# Patient Record
Sex: MALE | Race: BLACK OR AFRICAN AMERICAN | NOT HISPANIC OR LATINO | Employment: FULL TIME | URBAN - METROPOLITAN AREA
[De-identification: names, ages, dates, MRNs, and addresses within clinical notes are randomized per-mention and may not be internally consistent; named-entity substitution may affect disease eponyms.]

---

## 2018-04-18 ENCOUNTER — HOSPITAL ENCOUNTER (EMERGENCY)
Facility: OTHER | Age: 58
Discharge: HOME | End: 2018-04-18
Attending: EMERGENCY MEDICINE
Payer: COMMERCIAL

## 2018-04-18 VITALS
HEART RATE: 66 BPM | WEIGHT: 300 LBS | RESPIRATION RATE: 18 BRPM | BODY MASS INDEX: 45.47 KG/M2 | HEIGHT: 68 IN | SYSTOLIC BLOOD PRESSURE: 163 MMHG | OXYGEN SATURATION: 97 % | DIASTOLIC BLOOD PRESSURE: 86 MMHG | TEMPERATURE: 98 F

## 2018-04-18 DIAGNOSIS — R07.9 CHEST PAIN: ICD-10-CM

## 2018-04-18 DIAGNOSIS — M79.602 PAIN OF LEFT UPPER EXTREMITY: ICD-10-CM

## 2018-04-18 DIAGNOSIS — M54.2 NECK PAIN: ICD-10-CM

## 2018-04-18 DIAGNOSIS — R07.9 CHEST PAIN, UNSPECIFIED TYPE: Primary | ICD-10-CM

## 2018-04-18 LAB
ALBUMIN SERPL BCP-MCNC: 3.6 G/DL
ALP SERPL-CCNC: 61 U/L
ALT SERPL W/O P-5'-P-CCNC: 13 U/L
ANION GAP SERPL CALC-SCNC: 8 MMOL/L
AST SERPL-CCNC: 18 U/L
BASOPHILS # BLD AUTO: 0.04 K/UL
BASOPHILS NFR BLD: 0.6 %
BILIRUB SERPL-MCNC: 0.4 MG/DL
BNP SERPL-MCNC: 17 PG/ML
BUN SERPL-MCNC: 13 MG/DL
CALCIUM SERPL-MCNC: 9.4 MG/DL
CHLORIDE SERPL-SCNC: 99 MMOL/L
CO2 SERPL-SCNC: 36 MMOL/L
CREAT SERPL-MCNC: 0.9 MG/DL
DIFFERENTIAL METHOD: ABNORMAL
EOSINOPHIL # BLD AUTO: 0.1 K/UL
EOSINOPHIL NFR BLD: 2.1 %
ERYTHROCYTE [DISTWIDTH] IN BLOOD BY AUTOMATED COUNT: 15 %
EST. GFR  (AFRICAN AMERICAN): >60 ML/MIN/1.73 M^2
EST. GFR  (NON AFRICAN AMERICAN): >60 ML/MIN/1.73 M^2
GLUCOSE SERPL-MCNC: 85 MG/DL
HCT VFR BLD AUTO: 44.5 %
HGB BLD-MCNC: 13.8 G/DL
LYMPHOCYTES # BLD AUTO: 2.8 K/UL
LYMPHOCYTES NFR BLD: 40.4 %
MCH RBC QN AUTO: 25.8 PG
MCHC RBC AUTO-ENTMCNC: 31 G/DL
MCV RBC AUTO: 83 FL
MONOCYTES # BLD AUTO: 0.3 K/UL
MONOCYTES NFR BLD: 3.8 %
NEUTROPHILS # BLD AUTO: 3.6 K/UL
NEUTROPHILS NFR BLD: 53.1 %
PLATELET # BLD AUTO: 270 K/UL
PMV BLD AUTO: 9.7 FL
POTASSIUM SERPL-SCNC: 3.4 MMOL/L
PROT SERPL-MCNC: 8 G/DL
RBC # BLD AUTO: 5.35 M/UL
SODIUM SERPL-SCNC: 143 MMOL/L
TROPONIN I SERPL DL<=0.01 NG/ML-MCNC: 0.02 NG/ML
WBC # BLD AUTO: 6.8 K/UL

## 2018-04-18 PROCEDURE — 85025 COMPLETE CBC W/AUTO DIFF WBC: CPT

## 2018-04-18 PROCEDURE — 25000003 PHARM REV CODE 250: Performed by: PHYSICIAN ASSISTANT

## 2018-04-18 PROCEDURE — 83880 ASSAY OF NATRIURETIC PEPTIDE: CPT

## 2018-04-18 PROCEDURE — 93005 ELECTROCARDIOGRAM TRACING: CPT

## 2018-04-18 PROCEDURE — 80053 COMPREHEN METABOLIC PANEL: CPT

## 2018-04-18 PROCEDURE — 93010 ELECTROCARDIOGRAM REPORT: CPT | Mod: ,,, | Performed by: INTERNAL MEDICINE

## 2018-04-18 PROCEDURE — 99284 EMERGENCY DEPT VISIT MOD MDM: CPT | Mod: 25

## 2018-04-18 PROCEDURE — 84484 ASSAY OF TROPONIN QUANT: CPT

## 2018-04-18 RX ORDER — NAPROXEN 375 MG/1
375 TABLET ORAL 2 TIMES DAILY PRN
Qty: 30 TABLET | Refills: 0 | Status: SHIPPED | OUTPATIENT
Start: 2018-04-18 | End: 2019-04-15

## 2018-04-18 RX ORDER — ASPIRIN 325 MG
325 TABLET, DELAYED RELEASE (ENTERIC COATED) ORAL
Status: COMPLETED | OUTPATIENT
Start: 2018-04-18 | End: 2018-04-18

## 2018-04-18 RX ADMIN — ASPIRIN 325 MG: 325 TABLET, DELAYED RELEASE ORAL at 02:04

## 2018-04-18 NOTE — ED NOTES
Pt in recliner with no complaints of active chest pain at this time; pt updated on POC and verbalized understanding; comfort needs addressed; NADN; VSS: will monitor; call light in reach

## 2018-04-18 NOTE — ED NOTES
Patient Identifiers for James Florez checked and correct  LOC: The patient is awake, alert and aware of environment with an appropriate affect, the patient is oriented x 3 and speaking appropriate.  APPEARANCE: Patient resting comfortably and in no acute distress. The patient is clean and well groomed. The patient's clothing is properly fastened.  SKIN: The skin is warm and dry. The patient has normal skin turgor and moist mucus membranes. No rashes or lesions upon observation. Skin Intact , no breakdown noted.  Musculoskeletal :  Normal range of motion noted. Moves all extremities well, No swelling or tenderness noted  RESPIRATORY: Airway is open and patent, respirations are spontaneous, patient has a normal effort and rate. Breath sounds are clear & equal, bilaterally.  CARDIAC: Patient has a normal rate and rhythm, no peripheral edema noted, capillary refill < 3 seconds.   ABDOMEN: Soft and non tender to palpation, no distention observed.   PULSES: 2+ radial & pedal pulses, symmetrical in all extremities.  NEUROLOGIC: PERRL, Pupils 3mm and reacts briskly to light. Motor strength 5/5 all extremities.  The pt's facial expression is symmetrical, patient moving all extremities, normal sensation in all extremities when touched with a finger.The patient is awake, alert and cooperative with a calm affect, patient is aware of environment.      Will continue to monitor

## 2018-04-18 NOTE — ED PROVIDER NOTES
Encounter Date: 4/18/2018       History     Chief Complaint   Patient presents with    Chest Pain     chest pain sine last night     Patient is a 57-year-old male with history of hypertension who presents to the emergency department with left arm pain and left-sided chest pain.  Patient states about a week ago, he developed a sharp pain in his left arm.  The pain has been persistent left arm.  He denies any injury.  He states the pain is worse with movement of his neck.  He states the pain is a burning, tingling sensation that radiates from his neck into his left hand.  He states late last night while lying down, he developed a spasming pain on left side of his chest.  He states this pain has been presents since last night.  He denies any worsening on exertion.  He denies shortness of breath.  He denies lower extremity edema.  He denies cough or fever.  He denies recent travel, recent surgery, previous blood clot, or exogenous hormones.      The history is provided by the patient.     Review of patient's allergies indicates:  No Known Allergies  Past Medical History:   Diagnosis Date    Depression     Hypertension      History reviewed. No pertinent surgical history.  History reviewed. No pertinent family history.  Social History   Substance Use Topics    Smoking status: Never Smoker    Smokeless tobacco: Not on file    Alcohol use Not on file      Comment: occasionally     Review of Systems   Constitutional: Negative for activity change, appetite change, chills, fatigue and fever.   HENT: Negative for congestion, ear discharge, ear pain, postnasal drip, rhinorrhea, sore throat and trouble swallowing.    Respiratory: Negative for cough, chest tightness, shortness of breath and wheezing.    Cardiovascular: Positive for chest pain. Negative for palpitations and leg swelling.   Gastrointestinal: Negative for abdominal pain, blood in stool, constipation, diarrhea, nausea and vomiting.   Genitourinary: Negative for  dysuria, flank pain and hematuria.   Musculoskeletal: Negative for back pain, neck pain and neck stiffness.        Arm pain   Skin: Negative for rash and wound.   Neurological: Negative for dizziness, syncope, speech difficulty, weakness, light-headedness, numbness and headaches.       Physical Exam     Initial Vitals [04/18/18 1257]   BP Pulse Resp Temp SpO2   (!) 186/101 85 18 98.1 °F (36.7 °C) 97 %      MAP       129.33         Physical Exam    Nursing note and vitals reviewed.  Constitutional: He appears well-developed and well-nourished. He is not diaphoretic.  Non-toxic appearance. No distress.   HENT:   Head: Normocephalic.   Right Ear: Hearing and external ear normal.   Left Ear: Hearing and external ear normal.   Nose: Nose normal.   Mouth/Throat: Uvula is midline, oropharynx is clear and moist and mucous membranes are normal. No oropharyngeal exudate.   Eyes: Conjunctivae are normal. Pupils are equal, round, and reactive to light.   Neck: Normal range of motion. Neck supple. Muscular tenderness (left sided cervical) present. No spinous process tenderness present. Normal range of motion present. No neck rigidity.   Cardiovascular: Normal rate, regular rhythm and normal heart sounds. Exam reveals no gallop and no friction rub.    No murmur heard.  No lower extremity   Pulmonary/Chest: Breath sounds normal. No respiratory distress. He has no wheezes. He has no rhonchi. He has no rales. He exhibits no tenderness.   Abdominal: Soft. Bowel sounds are normal. There is no tenderness.   Lymphadenopathy:     He has no cervical adenopathy.   Neurological: He is alert and oriented to person, place, and time. He has normal strength.   Skin: Skin is warm and dry. Capillary refill takes less than 2 seconds.   Psychiatric: He has a normal mood and affect.         ED Course   Procedures  Labs Reviewed   CBC W/ AUTO DIFFERENTIAL - Abnormal; Notable for the following:        Result Value    Hemoglobin 13.8 (*)     MCH 25.8  (*)     MCHC 31.0 (*)     RDW 15.0 (*)     Mono% 3.8 (*)     All other components within normal limits   COMPREHENSIVE METABOLIC PANEL - Abnormal; Notable for the following:     Potassium 3.4 (*)     CO2 36 (*)     All other components within normal limits   TROPONIN I   B-TYPE NATRIURETIC PEPTIDE     EKG Readings: (Independently Interpreted)   Initial Reading: No STEMI. Rhythm: Normal Sinus Rhythm. Heart Rate: 84.       X-Rays:   Independently Interpreted Readings:   Other Readings:  No cardiomegaly.  Calcification along the wall of aorta.    Imaging Results          X-Ray Chest PA And Lateral (Final result)  Result time 04/18/18 14:53:00    Final result by Yessica Denny MD (04/18/18 14:53:00)                 Narrative:    EXAMINATION:  XR CHEST PA AND LATERAL    CLINICAL HISTORY:  Chest pain, unspecified    TECHNIQUE:  PA and lateral views of the chest were performed.    COMPARISON:  Cardiac size is not enlarged.  There is calcification along the wall of the aorta.  Lungs are somewhat underinflated, image quality also degraded by patient body habitus.  There is no large volume of pleural fluid.  No focal consolidation is present.  There is some mild atelectasis at each lung base.  Mild prominence of markings is seen throughout the lungs bilaterally.  This is favored to reflect a combination of the diminished inspiration and prominent overlying soft tissue rather than parenchymal disease.    FINDINGS:  As above      Electronically signed by: Yessica Denny MD  Date:    04/18/2018  Time:    14:53                              Medical Decision Making:   Initial Assessment:   Urgent evaluation of a 57-year-old male with history of hypertension who presents to the emergency department with arm pain and chest pain.  Patient is afebrile, nontoxic appearing, and hemodynamically stable.  Patient's arm pain is been present for 1 week.  Is reproducible on exam.  Is worse with movement of the neck.  No injury.  No  midline tenderness of spine.  Patient's chest pain is described as spasming on the left side of his chest.  It is neither worsened nor alleviated by any specific factors.  No associated shortness of breath.  I do not suspect PE.  I do not suspect ACS, but cardiac workup will be performed with patient's coronary artery disease risk factors.  ED Management:  Patient's pain has been constant since last night.  Troponin is not elevated, which I suspect would be elevated if cardiac etiology.  EKG reveals no acute ischemia.  Chest x-ray reveals no acute cardiopulmonary process.  There is calcification along the wall of the aorta.  I suspect patient is experiencing radiculopathy pain.  He is given anti-inflammatories.  He is advised to establish care with primary care.  He is also advised to follow-up with cardiology for outpatient stress test.  Case discussed in depth with supervising physician who agrees with plan.  Other:   I have discussed this case with another health care provider.       <> Summary of the Discussion: Dr. Mascorro                      Clinical Impression:   The primary encounter diagnosis was Chest pain, unspecified type. Diagnoses of Chest pain, Neck pain, and Pain of left upper extremity were also pertinent to this visit.                           Meghan Mackenzie PA-C  04/18/18 8625

## 2018-04-18 NOTE — ED TRIAGE NOTES
"Pt reports left side" chest spasm" that he states comes and goes. Pt denies nausea or sob or any other s/s.  He denies that anything makes the pain worse.   "

## 2019-03-21 ENCOUNTER — TELEPHONE (OUTPATIENT)
Dept: SURGERY | Facility: CLINIC | Age: 59
End: 2019-03-21

## 2019-03-21 NOTE — TELEPHONE ENCOUNTER
Left a detailed message stating that he is scheduled in the wrong Department and that he should not come in tonight due to the bariatric team leaving at 5pm. I also contacted [employer] in Bariatrics) about this pt as well via [facility]

## 2019-04-15 ENCOUNTER — CLINICAL SUPPORT (OUTPATIENT)
Dept: BARIATRICS | Facility: CLINIC | Age: 59
End: 2019-04-15
Payer: COMMERCIAL

## 2019-04-15 ENCOUNTER — OFFICE VISIT (OUTPATIENT)
Dept: BARIATRICS | Facility: CLINIC | Age: 59
End: 2019-04-15
Payer: COMMERCIAL

## 2019-04-15 VITALS
HEIGHT: 68 IN | BODY MASS INDEX: 47.74 KG/M2 | HEART RATE: 74 BPM | SYSTOLIC BLOOD PRESSURE: 180 MMHG | DIASTOLIC BLOOD PRESSURE: 91 MMHG | WEIGHT: 315 LBS

## 2019-04-15 DIAGNOSIS — E66.01 MORBID OBESITY WITH BMI OF 50.0-59.9, ADULT: Primary | ICD-10-CM

## 2019-04-15 DIAGNOSIS — I10 ESSENTIAL HYPERTENSION: ICD-10-CM

## 2019-04-15 DIAGNOSIS — Z98.84 LAP-BAND SURGERY STATUS: ICD-10-CM

## 2019-04-15 DIAGNOSIS — M19.90 ARTHRITIS: ICD-10-CM

## 2019-04-15 DIAGNOSIS — F41.9 ANXIETY: ICD-10-CM

## 2019-04-15 PROCEDURE — 99499 NO LOS: ICD-10-PCS | Mod: S$GLB,,, | Performed by: DIETITIAN, REGISTERED

## 2019-04-15 PROCEDURE — 99999 PR PBB SHADOW E&M-EST. PATIENT-LVL IV: CPT | Mod: PBBFAC,,, | Performed by: PHYSICIAN ASSISTANT

## 2019-04-15 PROCEDURE — 99999 PR PBB SHADOW E&M-EST. PATIENT-LVL II: CPT | Mod: PBBFAC,,, | Performed by: DIETITIAN, REGISTERED

## 2019-04-15 PROCEDURE — 99999 PR PBB SHADOW E&M-EST. PATIENT-LVL II: ICD-10-PCS | Mod: PBBFAC,,, | Performed by: DIETITIAN, REGISTERED

## 2019-04-15 PROCEDURE — 99205 OFFICE O/P NEW HI 60 MIN: CPT | Mod: S$GLB,,, | Performed by: PHYSICIAN ASSISTANT

## 2019-04-15 PROCEDURE — 99499 UNLISTED E&M SERVICE: CPT | Mod: S$GLB,,, | Performed by: DIETITIAN, REGISTERED

## 2019-04-15 PROCEDURE — 99205 PR OFFICE/OUTPT VISIT, NEW, LEVL V, 60-74 MIN: ICD-10-PCS | Mod: S$GLB,,, | Performed by: PHYSICIAN ASSISTANT

## 2019-04-15 PROCEDURE — 99999 PR PBB SHADOW E&M-EST. PATIENT-LVL IV: ICD-10-PCS | Mod: PBBFAC,,, | Performed by: PHYSICIAN ASSISTANT

## 2019-04-15 RX ORDER — AMLODIPINE BESYLATE 10 MG/1
10 TABLET ORAL DAILY
COMMUNITY
End: 2021-02-21

## 2019-04-15 RX ORDER — ALLOPURINOL 100 MG/1
1 TABLET ORAL DAILY
COMMUNITY
Start: 2019-01-25

## 2019-04-15 RX ORDER — PAROXETINE HYDROCHLORIDE 40 MG/1
1 TABLET, FILM COATED ORAL DAILY
COMMUNITY
Start: 2019-03-13

## 2019-04-15 NOTE — PATIENT INSTRUCTIONS
Bariatric Diet Grocery List      High in Protein:   ? Canned tuna or chicken (packed in water)  ? Lean ground turkey breast or ground round  ? Turkey or chicken (no skin)  ? Lean pork or beef   ? Scrambled, poached, or boiled eggs  ? Baked or broiled fish and seafood (not fried!)  ? Beans, edamame and lentils  ? Low fat deli meats: turkey, chicken, ham, roast beef  ? 1% or Skim Milk, Lactaid, or Soymilk  ? Low-fat cheese, cottage cheese, mozzarella string cheese, ricotta  ? Light yogurt, FF/SF frozen yogurt, custard, SF pudding  ? Protein drinks and protein bars with 0-4 grams sugar     Fruits, Vegetables and Snacks   - Green beans, broccoli, cauliflower, spinach, asparagus, carrots, lima beans, yellow squash, zucchini, etc.  - Apple, pineapple, peach, grapes, banana, watermelon, oranges, etc.  - Fruit canned in its own juice or in water (not in syrup)  - Raw veggies  - Lettuce: dark greens like spinach and Ronny  - Unsalted Nuts  - [employer] beef jerky     Fluids:   Skim/1% milk, Lactaid, Soymilk  Sugar-free beverages  (decaf and non-carbonated)  Decaf coffee & decaf tea   Water     Healthy Eating on the go ~ Pre and Post-Surgery     (*) Means this meal is appropriate for pre-surgery consumption because it is >30g of protein per meal. Post-surgery, may want to split meal in half or save a small portion for a snack.     [facility] Kitchen  Item  Calories  Protein (g)   Mediterranean Lamb [place]  350 22   *Chicken Kabobs 290 41   *[place] Kabobs 330 40   Shrimp Kabobs 170 23   *Steak Kabobs 490 42   *Cauliflower Rice Bowl- chicken (salmon, lamb)  490 41   Mediterranean Chicken 260 34   *Protein Power Plate 520 41   Side items: Greek side salad, fruit salad, roasted vegetables, baked falafel       Huffmans   Item  Calories Protein (g)   Artisan Grilled Chicken [place], no bun  <380 36   *[place] Grilled Chicken Salad, no dressing <320 42   Double Hamburger, no bun <440 25   Side items: apple slices, side salad        Kenyas   Item Calories Protein (g)   Grilled Chicken [place], no bun  <370 34   [place] Chicken Salad, half size, no dressing 320  22   Apple Pecan Chicken Salad, half size, no dressing  340 20   Large Chili 250 21   Side items: garden or caesar side salad (no croutons), apple bites       Jimbo Calero   Item Calories Protein (g)   Grilled Chicken [place], no bun <470 37   Whopper Jr., no bun <310 13   Double Hamburger, no bun  <390 23   *Chicken Garden Salad, no croutons, use ½ packet of dressing  <520 40   Side items: garden side salad,         Chick-madelin-A  Item Calories Protein (g)   Grilled Chicken [place], no bun  <310 29   Grilled Nuggets, 8 count 140 25   Grilled Chicken Market Salad with light balsamic dressing 330 28   Small Chicken Tortilla Soup, no tortilla strips 340 23   Side items: side salad, superfood side salad, carrot raisin salad, fruit cup,          Sonic  Item Calories Protein (g)   Jr. Choi, no bun  <330 15   Classic Grilled Chicken [place], no bun <490 31   Hearty Shanks, no fritos 140 10       Ronnies   Item Calories Protein (g)   Blackened Chicken Tenders, 3 piece 170 26   Side items: green beans             Derrell Perez   Item Calories Protein (g)   Unwich: any sandwich made wrapped in lettuce instead of bread. Ask for no hinojosa.      Original Roast Beef Unwich 260 16   [place] Breast Unwich 230 14   Perfect Micronesian Unwich 340 22   Ham and Provolone Unwich 350 19   Tuna Salad [place] 280 11   The Veggie Unwich 410 17   Side items: dill pickle          Chipotle  Item Calories Protein (g)   *Salad with your choice of meat, beans, fresh tomato salsa, fajita veggies, and guacamole (NO rice) ~375 ~40        Applebees  Item Calories Protein (g)   Grilled Chicken Breast 290 38   Andrew Shrimp Salad, no wonton strips, use ½ dressing <390 26   Blackened Shrimp Caesar Salad, no croutons, use ½ dressing  <660 25   *Grilled Chicken Caesar Salad, no croutons, use ½ dressing <770 47   Tunica [place]  with Maple Mustard Glaze 350 37   Side Grilled Shrimp Skewer 110 27   Side items: steamed broccoli, garlicky green beans,               [employer]  Item Calories Protein (g)   *Spinach & Mushroom Omelette, no hollandaise sauce  <890 46   *Garden Omelette 840 46   Egg White Vegetable Omelette 380 29   *Grilled Chicken & Veggie Salad, use ½ dressing  <680 38     Olive Garden   Item Calories Protein (g)   *Herb-Grilled [place] 460 45   House salad with, no croutons. (Add grilled chicken or shrimp) <400 25   Side items: steamed broccoli       Walk Ons   Tuna Tini 410 30   Venison Connell- Bowl 330 14   Chicken Berry Pecan Salad      Side items: seasonal fruit, broccoli, green beans side salad       Sample Menu Plan: [id number] Calories;  grams of Protein     DAY 1     Breakfast  1 cup 2% cottage cheese, 1/2 cup fruit      Snack  200 calorie low-carb protein drink (4 grams sugar or less)    Lunch  Tuna salad sandwich on whole wheat with lettuce and tomato, using light hinojosa    Snack  1oz unsalted nuts and 17 grapes (or a piece of fruit)    Dinner  3-4oz grilled fish   ½ mashed sweet potato with no calorie spray butter  1/2 cup cooked spinach, and1 cup salad with spray dressing    Snack  60 calorie ice cream bar, fudgsicle or frozen fruit bar      DAY 2    Breakfast  2 eggs with 1oz low-fat cheese, 1 slice whole wheat toast with spray margarine if desired    Snack  200 calorie low-carb protein drink (4 grams sugar or less)    Lunch  Turkey and low-fat cheese sandwich on whole wheat toast with lettuce and tomato    Snack  1 cup low-fat cottage cheese, ½ cup fruit    Dinner  Baked chicken thigh  ½ cup whole wheat pasta with olive oil and parmesan cheese  ½ cup cooked green beans, and1 cup salad with spray dressing    Snack  Greek yogurt cup    DAY 3    Breakfast   200 calorie low-carb protein drink (4 grams sugar or less)    Snack  Atkins protein bar    Lunch  Grilled Chicken sandwich on whole wheat, with lettuce, tomato and ¼  small avocado    Snack  2 sugar-free popsicles  1oz unsalted nuts    Dinner  1 cup red, white or black beans, ½ cup brown rice  ½ cup green beans or other cooked vegetable    Snack  Greek yogurt cup      DAY 4    Breakfast  1 tablespoon peanut butter and ½ banana on 1 slice whole wheat toast  Light yogurt cup (less than 100 calories)    Snack  Mozzarella string cheese  Fruit cup (no sugar added)    Lunch  1 cup whole wheat pasta, with 3oz chicken breast , and ½ cup steamed broccoli. Toss with 1 tbsp olive oil and ½ cup shredded parmesan cheese  1 cup salad with spray dressing    Dinner  1 cup broth based vegetable and noodle soup  200 calorie low-carb protein drink (4 grams sugar or less)    Snack  60 calorie ice cream bar, fudgsicle or frozen fruit bar    Eating Protein after Bariatric Surgery  After having Bariatric Surgery it can get confusing which foods are the best to eat, especially when it comes to getting in enough protein when you are on the go. Here are a few ideas on how to get in the best quality of protein when youre having a busy day.    Protein bars can be a great way to get in the calories and protein you need. NOTICE: Protein bars are high in calories and should be used as a meal replacement. You should purchase protein bars with at least 20g of protein and no more then 4g of sugar. A few bars that meet these guidelines are:    Product Name Serving Size Calories Protein Sugar   Atkins Advantage 1 bar (1.6 oz) 150-200 kcal 10-15g protein 1g sugar   Pure Protein 1  bar   (1.76 oz) 180 kcal   19g protein 2g sugar   Think Thin 1 bar     (2.1 oz) 240 kcal 20g protein 0g sugar   EAS Myoplex Carb Control 1 bar    (2.46 oz ) 260 kcal 25g protein 1g sugar   Power Bar Protein Plus Reduced Sugar 1 bar     (2.57 oz) 270 kcal 22g protein 1g sugar   Protein Revolution 1 bar       (2.75 oz) 280 kcal 32g protein 2g sugar   MET-RX Protein Plus 1 bar     (3.0 oz) 300 kcal 32g protein 3g sugar   Pure Protein 1 bar     (2.75 oz) 310 kcal 31g protein 3g sugar   Detour Lean Muscle 1 bar     (3.2 oz) 370 kcal 32g protein, 3g sugar

## 2019-04-15 NOTE — PATIENT INSTRUCTIONS
Prior to surgery you will need to complete:  - Dietitian consult and follow up appointments as needed  - PCP clearance  - Labs  - Chest X-ray  - EKG  - Psychological evaluation, Please call psychiatry [phone] to schedule  - EGD  - Stress test     In preparation for bariatric surgery, please complete the following:   · Discuss your current medications with your primary care provider, remember your medications will need to be crushed, chewable, or in liquid form for the first 3-6 months after a gastric bypass or sleeve.  For a gastric band, your medications will need to be crushed indefinitely.    · If you take a blood thinner such as: Coumadin (warfarin), Pradaxa (dabigatran), or Plavix (clopidogrel), you will need to speak with your prescribing provider on how or if this medication can be stopped before surgery.   · If you take a medication for depression or anxiety, you will need to begin crushing or opening the capsule 1-3 months prior to surgery.  Remember to discuss this with the psychologist or psychiatrist that you see.   · If you take medication for arthritis on a daily basis that is considered a non-steroidal anti-inflammatory (NSAID), please discuss with your prescribing physician an alternative medication.  After having gastric bypass or gastric sleeve, this group of medications is not appropriate to take due to increased risk of bleeding stomach ulcers.      DEFINITIONS  Appointments: Pre-scheduled meetings or consultations with any physician, advanced practice provider, dietitian, or psychologist, and labs, imaging studies, sleep studies, etc.   Late cancellation: Cancelling an appointment 24-48 hours prior to scheduled time.  No-Show appointment:  is when    You do NOT arrive to your appointment at the time its scheduled.   You call to cancel or cancel via [facility] less than 24 hours in advance of your scheduled appointment   You show up 15 minutes AFTER your scheduled appointment time  without any notification of being late.     POLICY  1. You are allowed up to 3 cancellations for appointments.    After 3 cancellations your case will be placed on hold for 2 months and after that time you can resume the program.   2. You are allowed only 1 no-show for an appointment.    You will be re-scheduled one time and if there is a 2nd no-show at any point, your case will be placed on hold for 3 months.  After 3 months you can resume the program.     3. Upon resuming the program after being placed on hold for either above mentioned reasons, if you have a late cancel or no show for any appointment, the bariatric team will review if youre an appropriate candidate for surgery at the monthly meeting.

## 2019-04-15 NOTE — PROGRESS NOTES
"NUTRITIONAL CONSULT    Referring Physician: Babatunde Mcdaniel M.D.  Reason for MNT Referral: Initial assessment for conversion of band to sleeve work-up    PAST MEDICAL HISTORY:   58 y.o. male  There is no height or weight on file to calculate BMI..  Weight history includes  Pt states that he was not always over weight.  Pt states that he started gaining weight all of a sudden esepically worse since Parris. States that he got the lap band placed in [place] at [facility] in , unsure of what type of band or who completed the surgery, states that he weighed 240 lbs prior to surgery and around 200 lbs at his lowest after surgery in . . Presently at highest weight,  States ideal would be 240 lbs.  Dieting attempts include The patient has tried Keto diet, low calorie diet.      Past Medical History:   Diagnosis Date    Anxiety     Arthritis     Hypertension        CLINICAL DATA:  58 y.o.-year-old Black or  male.  Height: 5'7.5"  Weight: 350.8 lbs  IBW: 157 lbs  BMI: 54.12  The patient's goal weight (50% EBW): 254 lbs  Personal goal weight: 240 lbs    Goal for Bariatric Surgery: to improve quality of life and walk a block    DAILY NUTRITIONAL NEEDS:  1842 Calories (using [place] St. Jeor Equation  BMR x AF 1.2 -1000 calories for wt loss)   Grams Protein (1.2-1.5 gms protein/kg IBW/d)    NUTRITION & HEALTH HISTORY:  Greatest challenge: skipping meal and sweets    Current diet recall:   Breakfast: Wong with boiled eggs or grits , water coffee with creamer and sugar  Lunch: Varies generally grab food out Chinese or [employer]  Dinner: cooked dinner red beans and rice, vegetable ie green beans, broccoli, asparagus  Snacks: sweet ie doughnuts  Snack before bed: ice cream    Current Diet:  Meal pattern: 3 meals, lunch on the go with sweet snacks  Protein supplements: Smoothie Abdias Muscle Punch  Snackin+ / day  Vegetables: Likes a variety. Eats 2-3 times per week.  Fruits: Likes a " variety. Eats rarely.  Beverages: water, sweet tea, coffee with sugar and cutting back on sodas; drinks lemonade  Dining out: Weekly.  4-5 times per wekMostly fast food, restaurants and take-out.  Cooking at home: Weekly. Stays with mom 4 days a weekMostly baked, grilled, smothered and fried meat, fish, starchy CHO and vegetables.    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  Grocery shopping and food prep mom does  Patient believes the household will be supportive after surgery.  Alcohol: 1 time week per week dirty martinis.  Smoking: None.    ASSESSMENT:  · Patient reports attempts at weight loss, only to regain lost weight.  · Patient demonstrated knowledge of healthy eating behaviors and exercise patterns; admits to not eating healthy and not exercising at this point.  · Patient states willingness to change lifestyle and make behavior modifications as evidenced by more food preparation at [facility].    Insurance requires NO medically supervised diet prior to consideration for bariatric surgery.    Barriers to Education: none    Stage of change: determination    NUTRITION DIAGNOSIS:    Obesity related to Excessive carbohydrate intake, Inadequate protein intake and Physical inactivity as evidence by BMI.    BARIATRIC DIET DISCUSSION/PLAN:  Discussed diet after surgery and related to patient's food record.  Reviewed nutrition guidelines for before and after surgery.  Answered all questions.  Work on Bariatric Nutrition Checklist.  Work on expanding variety of vegetables.  Work on gradually cutting back on starchy CHO in the diet.  Begin trying various protein supplements to determine preference.  5-6 meals per day.  Reduce frequency of dining out.  More grocery shopping  and meal preparation at home.  Increase exercise.  Return to clinic.    RECOMMENDATIONS:  Patient is a potential candidate for bariatric surgery.    Needs additional visit(s) with RD.    Patient verbalized understanding.    Expect fair  compliance after surgery at this time.    Communicated nutrition plan with bariatric team.    SESSION TIME:  60 minutes

## 2019-04-15 NOTE — PROGRESS NOTES
BARIATRIC NEW PATIENT EVALUATION    CHIEF COMPLAINT:   Morbid Obesity Body mass index is 54.12 kg/m². and inability to lose weight.    HISTORY OF PRESENT ILLNESS:  James Florez  is a 58 y.o. male presenting for morbid obesity Body mass index is 54.12 kg/m². and inability to lose weight. Pt states that he was not always over weight.  Pt states that he started gaining weight all of a sudden esepically worse since Parris. States that he got the lap band placed in [place] at [facility] in 2008, unsure of what type of band or who completed the surgery, states that he weighed 240 lbs prior to surgery and around 200 lbs at his lowest after surgery in 2009. States that he also never had his band adjusted. The patient has tried Keto diet, low calorie diet.. Presently at highest weight,  States ideal would be 240 lbs.      PAST MEDICAL HISTORY:  Past Medical History:   Diagnosis Date    Anxiety     Arthritis     Hypertension          PAST SURGICAL HISTORY:  Past Surgical History:   Procedure Laterality Date    ANTERIOR CRUCIATE LIGAMENT REPAIR      LAPAROSCOPIC GASTRIC BANDING      2009/[place]       FAMILY HISTORY:  Family History   Problem Relation Age of Onset    Hypertension Mother     Alcohol abuse Father     No Known Problems Sister     No Known Problems Brother     No Known Problems Son     No Known Problems Sister        SOCIAL HISTORY:  Social History     Socioeconomic History    Marital status: Single     Spouse name: Not on file    Number of children: Not on file    Years of education: Not on file    Highest education level: Not on file   Occupational History    Not on file   Social Needs    Financial resource strain: Not on file    Food insecurity:     Worry: Not on file     Inability: Not on file    Transportation needs:     Medical: Not on file     Non-medical: Not on file   Tobacco Use    Smoking status: Never Smoker   Substance and Sexual Activity    Alcohol use: Not on file      "Comment: occasionally    Drug use: No    Sexual activity: Not on file   Lifestyle    Physical activity:     Days per week: Not on file     Minutes per session: Not on file    Stress: Not on file   Relationships    Social connections:     Talks on phone: Not on file     Gets together: Not on file     Attends Orthodox service: Not on file     Active member of club or organization: Not on file     Attends meetings of clubs or organizations: Not on file     Relationship status: Not on file   Other Topics Concern    Not on file   Social History Narrative    Not on file       MEDICATIONS:  Current Outpatient Medications   Medication Sig Dispense Refill    naproxen (NAPROSYN) 375 MG tablet Take 1 tablet (375 mg total) by mouth 2 (two) times daily as needed (take with food; take as needed for pain). 30 tablet 0     No current facility-administered medications for this visit.        ALLERGIES:  Review of patient's allergies indicates:  No Known Allergies    ROS:  Review of Systems   Constitutional: Negative for chills and fever.   HENT: Negative for tinnitus.    Eyes: Negative for blurred vision and double vision.   Respiratory: Positive for shortness of breath (cannot climb stairs without SOB ). Negative for cough.    Cardiovascular: Positive for leg swelling. Negative for chest pain and palpitations.   Gastrointestinal: Negative for abdominal pain, constipation, diarrhea, heartburn, nausea and vomiting.   Genitourinary: Negative for dysuria.   Musculoskeletal: Positive for joint pain. Negative for back pain and neck pain.   Skin: Negative for rash.   Neurological: Negative for dizziness, tingling and headaches.       PE:  Vitals:    04/15/19 1515   Weight: (!) 159.1 kg (350 lb 12 oz)   Height: 5' 7.5" (1.715 m)       Physical Exam   Constitutional: He is oriented to person, place, and time. He appears well-developed and well-nourished.   HENT:   Head: Normocephalic and atraumatic.   Eyes: Pupils are equal, round, " and reactive to light. Conjunctivae and EOM are normal.   Neck: Normal range of motion. Neck supple. No JVD present. No thyromegaly present.   Cardiovascular: Normal rate, regular rhythm, normal heart sounds and intact distal pulses.   No murmur heard.  Pulmonary/Chest: Effort normal and breath sounds normal. He has no wheezes.   Abdominal: Soft. Bowel sounds are normal. He exhibits no distension. There is no tenderness. No hernia.   Port cannot be palpated due to body habitus    Musculoskeletal: Normal range of motion. He exhibits edema (BLE + 1).   Neurological: He is alert and oriented to person, place, and time. Coordination normal.   Skin: Skin is warm and dry. Capillary refill takes less than 2 seconds. He is not diaphoretic. No erythema. No pallor.   Psychiatric: He has a normal mood and affect. His behavior is normal. Judgment and thought content normal.        DIAGNOSIS:  1. Morbid Obesity with Body mass index is 54.12 kg/m². and inability to lose weight.  2. Co-morbidities: HTN and  depression    PLAN:  The patient is  Good candidate for Bariatric Surgery. he is interested in sleeve gastrectomy with Dr Mcdaniel. The surgery and post-op care were discussed in detail with the patient. All questions were answered.    he understands that bariatric surgery is a tool to aid in weight loss and that he needs to be committed to the diet and exercise post-operatively for successful weight loss.  Discussed expected weight loss outcomes after surgery which is 50% of the excess weight on his frame.  Goal weight is 240 #s.  Discussed with patient that bariatric surgery is not the easy way out and that it will take plenty of dedication on the patient's part to be successful. Also discussed the possibility of weight regain if the patient strays from the diet guidelines or exercise requirements. Patient verbalized understanding and wishes to proceed with the work-up.    ORDERS:  1. Stress Test, Chest X-Ray, EKG, EGD and  Upper GI  2. Psychological Consult, Bariatric Dietician Consult and PCP Clearance  3. Bariatric Labs: BMP, CBC, Folate Serum, H. pylori, HgA1C, Hepatic Panel/LFT, Iron & TIBC, Lipid Profile, Magnesium, Phosphate, T3, T4, TSH, Free T4, Vitamin B12, and Vitamin B1.  4. Mental health contract     Primary Physician: Humble Taylor MD  RTC: As scheduled.    60 minute visit, over 50% of time spent counseling patient face to face on surgical options, risks, benefits, expected diet, recommended exercise regimen, and expected weight loss.

## 2019-04-18 ENCOUNTER — HOSPITAL ENCOUNTER (OUTPATIENT)
Dept: RADIOLOGY | Facility: HOSPITAL | Age: 59
Discharge: HOME | End: 2019-04-18
Attending: PHYSICIAN ASSISTANT
Payer: COMMERCIAL

## 2019-04-18 ENCOUNTER — HOSPITAL ENCOUNTER (OUTPATIENT)
Dept: CARDIOLOGY | Facility: CLINIC | Age: 59
Discharge: HOME | End: 2019-04-18
Attending: PHYSICIAN ASSISTANT
Payer: COMMERCIAL

## 2019-04-18 ENCOUNTER — TELEPHONE (OUTPATIENT)
Dept: BARIATRICS | Facility: CLINIC | Age: 59
End: 2019-04-18

## 2019-04-18 DIAGNOSIS — M19.90 ARTHRITIS: ICD-10-CM

## 2019-04-18 DIAGNOSIS — F41.9 ANXIETY: ICD-10-CM

## 2019-04-18 DIAGNOSIS — Z98.84 LAP-BAND SURGERY STATUS: ICD-10-CM

## 2019-04-18 DIAGNOSIS — I10 ESSENTIAL HYPERTENSION: ICD-10-CM

## 2019-04-18 DIAGNOSIS — R79.89 LOW VITAMIN D LEVEL: Primary | ICD-10-CM

## 2019-04-18 DIAGNOSIS — E66.01 MORBID OBESITY WITH BMI OF 50.0-59.9, ADULT: ICD-10-CM

## 2019-04-18 DIAGNOSIS — E87.6 LOW SERUM POTASSIUM LEVEL: ICD-10-CM

## 2019-04-18 PROCEDURE — 93000 ELECTROCARDIOGRAM COMPLETE: CPT | Mod: S$GLB,,, | Performed by: INTERNAL MEDICINE

## 2019-04-18 PROCEDURE — 71046 XR CHEST PA AND LATERAL: ICD-10-PCS | Mod: 26,,, | Performed by: RADIOLOGY

## 2019-04-18 PROCEDURE — 71046 X-RAY EXAM CHEST 2 VIEWS: CPT | Mod: 26,,, | Performed by: RADIOLOGY

## 2019-04-18 PROCEDURE — 93000 EKG 12-LEAD: ICD-10-PCS | Mod: S$GLB,,, | Performed by: INTERNAL MEDICINE

## 2019-04-18 PROCEDURE — 71046 X-RAY EXAM CHEST 2 VIEWS: CPT | Mod: TC,FY

## 2019-04-18 RX ORDER — POTASSIUM CHLORIDE 20 MEQ/1
20 TABLET, EXTENDED RELEASE ORAL 2 TIMES DAILY
Qty: 6 TABLET | Refills: 0 | Status: SHIPPED | OUTPATIENT
Start: 2019-04-18 | End: 2019-04-21

## 2019-04-18 RX ORDER — ERGOCALCIFEROL 1.25 MG/1
50000 CAPSULE ORAL
Qty: 24 CAPSULE | Refills: 0 | Status: SHIPPED | OUTPATIENT
Start: 2019-04-18

## 2019-04-18 NOTE — TELEPHONE ENCOUNTER
Spoke to patient about low potassium and low vitamin D. Will be sending prescriptions to preferred pharmacy.     Pt states understanding, will get prescriptions and take as prescribed.     Will recheck potassium in one week and recheck vitamin D in three months.     - Sergei Calloway PA-C

## 2019-04-22 DIAGNOSIS — A04.8 POSITIVE H. PYLORI TEST: Primary | ICD-10-CM

## 2019-04-22 RX ORDER — AMOXICILLIN 500 MG/1
1000 TABLET, FILM COATED ORAL EVERY 12 HOURS
Qty: 56 TABLET | Refills: 0 | Status: SHIPPED | OUTPATIENT
Start: 2019-04-22 | End: 2019-05-06

## 2019-04-22 RX ORDER — OMEPRAZOLE 20 MG/1
20 CAPSULE, DELAYED RELEASE ORAL 2 TIMES DAILY
Qty: 28 CAPSULE | Refills: 0 | Status: SHIPPED | OUTPATIENT
Start: 2019-04-22 | End: 2020-08-05

## 2019-04-22 RX ORDER — CLARITHROMYCIN 500 MG/1
500 TABLET, FILM COATED ORAL EVERY 12 HOURS
Qty: 28 TABLET | Refills: 0 | Status: SHIPPED | OUTPATIENT
Start: 2019-04-22 | End: 2019-05-06

## 2019-04-24 ENCOUNTER — TELEPHONE (OUTPATIENT)
Dept: BARIATRICS | Facility: CLINIC | Age: 59
End: 2019-04-24

## 2019-04-24 NOTE — TELEPHONE ENCOUNTER
Phoned patient and scheduled stress test on Friday.  He will call endo on Monday to schedule the EGD after the stress test is complete.

## 2019-04-24 NOTE — TELEPHONE ENCOUNTER
----- Message from Brianne Arnold sent at 4/24/2019  4:12 PM CDT -----  Contact: Patient   Needs Advice    Reason for call: Patient states that he is needing to schedule his stress test and EGD        Communication Preference: [phone]     Additional Information: please contact the patient

## 2019-04-26 ENCOUNTER — HOSPITAL ENCOUNTER (OUTPATIENT)
Dept: CARDIOLOGY | Facility: CLINIC | Age: 59
Discharge: HOME | End: 2019-04-26
Attending: PHYSICIAN ASSISTANT
Payer: COMMERCIAL

## 2019-04-26 VITALS
SYSTOLIC BLOOD PRESSURE: 142 MMHG | HEIGHT: 68 IN | HEART RATE: 77 BPM | RESPIRATION RATE: 16 BRPM | WEIGHT: 315 LBS | BODY MASS INDEX: 47.74 KG/M2 | DIASTOLIC BLOOD PRESSURE: 80 MMHG

## 2019-04-26 DIAGNOSIS — E66.01 MORBID OBESITY WITH BMI OF 50.0-59.9, ADULT: ICD-10-CM

## 2019-04-26 LAB
AV INDEX (PROSTH): 0.65
AV MEAN GRADIENT: 7.33 MMHG
AV PEAK GRADIENT: 12.53 MMHG
AV VALVE AREA: 2.26 CM2
AV VELOCITY RATIO: 0.68
BSA FOR ECHO PROCEDURE: 2.76 M2
CV ECHO LV RWT: 0.47 CM
CV STRESS BASE HR: 77 BPM
DIASTOLIC BLOOD PRESSURE: 80 MMHG
DOP CALC AO PEAK VEL: 1.77 M/S
DOP CALC AO VTI: 40.32 CM
DOP CALC LVOT AREA: 3.49 CM2
DOP CALC LVOT DIAMETER: 2.11 CM
DOP CALC LVOT PEAK VEL: 1.2 M/S
DOP CALC LVOT STROKE VOLUME: 91.15 CM3
DOP CALCLVOT PEAK VEL VTI: 26.08 CM
E WAVE DECELERATION TIME: 201.05 MSEC
E/A RATIO: 1.44
E/E' RATIO: 8.78
ECHO LV POSTERIOR WALL: 1.08 CM (ref 0.6–1.1)
FRACTIONAL SHORTENING: 37 % (ref 28–44)
INTERVENTRICULAR SEPTUM: 1.04 CM (ref 0.6–1.1)
LA MAJOR: 5.64 CM
LA MINOR: 5.36 CM
LA WIDTH: 4.95 CM
LEFT ATRIUM SIZE: 3.51 CM
LEFT ATRIUM VOLUME INDEX: 31.3 ML/M2
LEFT ATRIUM VOLUME: 81.17 CM3
LEFT INTERNAL DIMENSION IN SYSTOLE: 2.91 CM (ref 2.1–4)
LEFT VENTRICLE DIASTOLIC VOLUME INDEX: 37.33 ML/M2
LEFT VENTRICLE DIASTOLIC VOLUME: 96.78 ML
LEFT VENTRICLE MASS INDEX: 66.1 G/M2
LEFT VENTRICLE SYSTOLIC VOLUME INDEX: 12.6 ML/M2
LEFT VENTRICLE SYSTOLIC VOLUME: 32.56 ML
LEFT VENTRICULAR INTERNAL DIMENSION IN DIASTOLE: 4.59 CM (ref 3.5–6)
LEFT VENTRICULAR MASS: 171.5 G
LV LATERAL E/E' RATIO: 6.58
LV SEPTAL E/E' RATIO: 13.17
MV PEAK A VEL: 0.55 M/S
MV PEAK E VEL: 0.79 M/S
OHS CV CPX 1 MINUTE RECOVERY HEART RATE: 137 BPM
OHS CV CPX 85 PERCENT MAX PREDICTED HEART RATE MALE: 136
OHS CV CPX MAX PREDICTED HEART RATE: 160
OHS CV CPX PATIENT IS FEMALE: 0
OHS CV CPX PATIENT IS MALE: 1
OHS CV CPX PEAK DIASTOLIC BLOOD PRESSURE: 50 MMHG
OHS CV CPX PEAK HEAR RATE: 141 BPM
OHS CV CPX PEAK RATE PRESSURE PRODUCT: NORMAL
OHS CV CPX PEAK SYSTOLIC BLOOD PRESSURE: 158 MMHG
OHS CV CPX PERCENT MAX PREDICTED HEART RATE ACHIEVED: 88
OHS CV CPX PERCENT TARGET HEART RATE ACHIEVED: 103.68
OHS CV CPX RATE PRESSURE PRODUCT PRESENTING: NORMAL
OHS CV CPX TARGET HEART RATE: 136
PULM VEIN S/D RATIO: 0.98
PV PEAK D VEL: 0.64 M/S
PV PEAK S VEL: 0.63 M/S
RA PRESSURE: 3 MMHG
SINUS: 3.32 CM
STRESS ST DEPRESSION: 1 MM
SYSTOLIC BLOOD PRESSURE: 140 MMHG
TDI LATERAL: 0.12
TDI SEPTAL: 0.06
TDI: 0.09

## 2019-04-26 PROCEDURE — 93325 DOPPLER ECHO COLOR FLOW MAPG: CPT | Mod: S$GLB,,, | Performed by: INTERNAL MEDICINE

## 2019-04-26 PROCEDURE — 93351 STRESS TTE COMPLETE: CPT | Mod: S$GLB,,, | Performed by: INTERNAL MEDICINE

## 2019-04-26 PROCEDURE — 93325 ECHOCARDIOGRAM STRESS TEST WITH COLOR FLOW DOPPLER (CUPID ONLY): ICD-10-PCS | Mod: S$GLB,,, | Performed by: INTERNAL MEDICINE

## 2019-04-26 PROCEDURE — 93320 ECHOCARDIOGRAM STRESS TEST WITH COLOR FLOW DOPPLER (CUPID ONLY): ICD-10-PCS | Mod: S$GLB,,, | Performed by: INTERNAL MEDICINE

## 2019-04-26 PROCEDURE — 93320 DOPPLER ECHO COMPLETE: CPT | Mod: S$GLB,,, | Performed by: INTERNAL MEDICINE

## 2019-04-26 PROCEDURE — 93351 ECHOCARDIOGRAM STRESS TEST WITH COLOR FLOW DOPPLER (CUPID ONLY): ICD-10-PCS | Mod: S$GLB,,, | Performed by: INTERNAL MEDICINE

## 2019-04-26 RX ORDER — ATROPINE SULFATE 0.1 MG/ML
1 INJECTION INTRAVENOUS
Status: COMPLETED | OUTPATIENT
Start: 2019-04-26 | End: 2019-04-26

## 2019-04-26 RX ORDER — DOBUTAMINE HYDROCHLORIDE 100 MG/100ML
10 INJECTION INTRAVENOUS
Status: COMPLETED | OUTPATIENT
Start: 2019-04-26 | End: 2019-04-26

## 2019-04-26 RX ADMIN — DOBUTAMINE HYDROCHLORIDE 10 MCG/KG/MIN: 100 INJECTION INTRAVENOUS at 05:04

## 2019-04-26 RX ADMIN — ATROPINE SULFATE 1 MG: 0.1 INJECTION INTRAVENOUS at 05:04

## 2019-04-26 NOTE — NURSING NOTE
Patient identified via spelling of name and date of birth. NPO verified. Instructions given, verbalizes understanding, and consent signed. Voices no c/o chest pain or SOB upon admit.

## 2019-04-29 ENCOUNTER — HOSPITAL ENCOUNTER (EMERGENCY)
Facility: HOSPITAL | Age: 59
Discharge: HOME | End: 2019-04-29
Attending: EMERGENCY MEDICINE
Payer: COMMERCIAL

## 2019-04-29 VITALS
HEART RATE: 100 BPM | RESPIRATION RATE: 18 BRPM | SYSTOLIC BLOOD PRESSURE: 196 MMHG | HEIGHT: 68 IN | BODY MASS INDEX: 47.74 KG/M2 | OXYGEN SATURATION: 96 % | TEMPERATURE: 98 F | WEIGHT: 315 LBS | DIASTOLIC BLOOD PRESSURE: 84 MMHG

## 2019-04-29 DIAGNOSIS — M25.561 RIGHT KNEE PAIN, UNSPECIFIED CHRONICITY: Primary | ICD-10-CM

## 2019-04-29 PROCEDURE — 25000003 PHARM REV CODE 250: Performed by: PHYSICIAN ASSISTANT

## 2019-04-29 PROCEDURE — 99284 EMERGENCY DEPT VISIT MOD MDM: CPT | Mod: ,,, | Performed by: PHYSICIAN ASSISTANT

## 2019-04-29 PROCEDURE — 99283 EMERGENCY DEPT VISIT LOW MDM: CPT

## 2019-04-29 PROCEDURE — 99284 PR EMERGENCY DEPT VISIT,LEVEL IV: ICD-10-PCS | Mod: ,,, | Performed by: PHYSICIAN ASSISTANT

## 2019-04-29 RX ORDER — LIDOCAINE 50 MG/G
1 PATCH TOPICAL ONCE
Status: DISCONTINUED | OUTPATIENT
Start: 2019-04-29 | End: 2019-04-29 | Stop reason: HOSPADM

## 2019-04-29 RX ORDER — ACETAMINOPHEN 500 MG
1000 TABLET ORAL
Status: COMPLETED | OUTPATIENT
Start: 2019-04-29 | End: 2019-04-29

## 2019-04-29 RX ADMIN — ACETAMINOPHEN 1000 MG: 500 TABLET ORAL at 12:04

## 2019-04-29 RX ADMIN — LIDOCAINE 1 PATCH: 50 PATCH TOPICAL at 12:04

## 2019-04-29 NOTE — ED TRIAGE NOTES
James Florez, a 58 y.o. male presents to the ED via private auto with CC right knee pain. Denies injury or trauma to extremity.       Patient identifiers verified verbally with patient and correct for James Florez.    LOC/ APPEARANCE: The patient is AAOx4. Pt is speaking appropriately, no slurred speech.Pt reports pain level of 8/10 to right knee. Pt updated on POC.   SKIN: Skin is warm dry and intact, and color is consistent with ethnicity. Capillary refill <3 seconds. No breakdown or brusing visible. Mucus membranes moist, acyanotic. Edema noted to right knee.  RESPIRATORY: Airway is open and patent. Respirations-spontaneous, unlabored, regular rate, equal bilaterally on inspiration and expiration. No accessory muscle use noted.   CARDIAC: No peripheral edema noted, and patient has no c/o chest pain. Peripheral pulses present equal and strong throughout.  ABDOMEN:  Pt has complaints of minimal loose bowel movements, denies blood in stool. Pt reports normal appetite.   NEUROLOGIC: Eyes open spontaneously and facial expression symmetrical. Pt behavior appropriate to situation, and pt follows commands. Pt reports sensation present in all extremities when touched with a finger, denies any numbness or tingling. PERRLA  MUSCULOSKELETAL: Spontaneous movement noted to all extremities. Hand  equal and leg strength strong +5 bilaterally.   : No complaints of frequency, burning, urgency or blood in the urine. No complaints of incontinence.

## 2019-04-29 NOTE — ED PROVIDER NOTES
Encounter Date: 4/29/2019       History     Chief Complaint   Patient presents with    Knee Pain      R knee pain, just started hurting     12:12 PM    Patient is a 58-year-old male with a history of HTN, anxiety, arthritis that presents the ED with right knee pain. He denies any injury or trauma.  He states that he has been having pain for the past 4 day which is located to his generalized anterior knee.  He complains of 8/10 pain. Walking makes his pain worse.  Rest improved his pain. He has a history of gout which normally affects his feet.  He has not had anything for pain relief today.  He has had Advil p.m. 2 days ago.        Review of patient's allergies indicates:  No Known Allergies  Past Medical History:   Diagnosis Date    Anxiety     Arthritis     Hypertension      Past Surgical History:   Procedure Laterality Date    ANTERIOR CRUCIATE LIGAMENT REPAIR      LAPAROSCOPIC GASTRIC BANDING      2009/[place]     Family History   Problem Relation Age of Onset    Hypertension Mother     Alcohol abuse Father     No Known Problems Sister     No Known Problems Brother     No Known Problems Son     No Known Problems Sister      Social History     Tobacco Use    Smoking status: Never Smoker    Smokeless tobacco: Never Used   Substance Use Topics    Alcohol use: Yes     Comment: occasionally    Drug use: No     Review of Systems   Constitutional: Negative for chills and fever.   HENT: Negative for sore throat.    Respiratory: Negative for shortness of breath.    Cardiovascular: Negative for chest pain.   Gastrointestinal: Negative for nausea.   Genitourinary: Negative for dysuria.   Musculoskeletal: Positive for arthralgias and joint swelling. Negative for back pain.   Skin: Negative for rash.   Neurological: Negative for weakness.   Hematological: Does not bruise/bleed easily.       Physical Exam     Initial Vitals [04/29/19 1111]   BP Pulse Resp Temp SpO2   (!) 196/84 100 18 98.3 °F (36.8 °C) 96 %       MAP       --         Physical Exam    Vitals reviewed.  Constitutional: He appears well-developed and well-nourished. No distress.   HENT:   Head: Normocephalic and atraumatic.   Nose: Nose normal.   Eyes: Conjunctivae and EOM are normal.   Neck: Normal range of motion.   Cardiovascular: Normal rate.   Pulmonary/Chest: No respiratory distress. He has no wheezes. He has no rales.   Musculoskeletal: Normal range of motion.        Right knee: He exhibits normal range of motion, no swelling, no effusion, no ecchymosis, no deformity, no erythema and no bony tenderness. Tenderness (generalized) found.   No difficulty ambulating.   Neurological: He is alert and oriented to person, place, and time. He has normal strength. No sensory deficit.   Skin: Skin is warm and dry. No erythema.   Psychiatric: He has a normal mood and affect. Thought content normal.         ED Course   Procedures  Labs Reviewed - No data to display       Imaging Results    None          Medical Decision Making:   History:   Old Medical Records: I decided to obtain old medical records.  Old Records Summarized: records from clinic visits.       <> Summary of Records: Old X-ray shows a spur extending off anterior/superior margin of R patella.  Initial Assessment:   Patient is a 58 year old obese male that presents to the ED with R knee pain. No trauma or injury.  Differential Diagnosis:   Includes but is not limited to arthritis.  Have considered but do not suspect fractures or dislocations given no injury or trauma.    ED Management:  He does not meet [place] Knee rules. No indication to obtain any emergent imaging.  His symptoms are most likely due to arthritis.  I will give patient Tylenol here.  Lidocaine patch was applied to his right knee for additional relief.  I advised OTC Tylenol for pain relief.  He is to follow up with Orthopedic Surgery for further evaluation if his symptoms not improved.  All questions were answered.  Patient comfortable  with plan and stable for discharge.                      Clinical Impression:       ICD-10-CM ICD-9-CM   1. Right knee pain, unspecified chronicity M25.561 719.46         Disposition:   Disposition: Discharged  Condition: Stable                        Chiara Elena PA-C  04/29/19 2146

## 2019-04-29 NOTE — DISCHARGE INSTRUCTIONS
Take acetaminphen 650 - 1000 mg 6-8 hours. You can try over the counter lidocaine patch for temporary relief.   Call and follow up with your primary care doctor, Sleep Clinic, and Orthopedics if your symptoms not improve or worsen.    Future Appointments   Date Time Provider Department Center   5/9/2019  3:30 PM Rhina Menjivar RD [facility]     Our goal in the emergency department is to always give you outstanding care and exceptional service. You may receive a survey by mail or e-mail in the next week regarding your experience in our ED. We would greatly appreciate your completing and returning the survey. Your feedback provides us with a way to recognize our staff who give very good care and it helps us learn how to improve when your experience was below our aspiration of excellence.

## 2019-05-09 ENCOUNTER — CLINICAL SUPPORT (OUTPATIENT)
Dept: BARIATRICS | Facility: CLINIC | Age: 59
End: 2019-05-09
Payer: COMMERCIAL

## 2019-05-09 DIAGNOSIS — I10 ESSENTIAL HYPERTENSION: ICD-10-CM

## 2019-05-09 DIAGNOSIS — E66.01 MORBID OBESITY WITH BODY MASS INDEX OF 50 OR HIGHER: ICD-10-CM

## 2019-05-09 DIAGNOSIS — Z71.3 NUTRITIONAL COUNSELING: ICD-10-CM

## 2019-05-09 PROCEDURE — 97803 PR MED NUTR THER, SUBSQ, INDIV, EA 15 MIN: ICD-10-PCS | Mod: S$GLB,,, | Performed by: DIETITIAN, REGISTERED

## 2019-05-09 PROCEDURE — 99999 PR PBB SHADOW E&M-EST. PATIENT-LVL I: CPT | Mod: PBBFAC,,, | Performed by: DIETITIAN, REGISTERED

## 2019-05-09 PROCEDURE — 99999 PR PBB SHADOW E&M-EST. PATIENT-LVL I: ICD-10-PCS | Mod: PBBFAC,,, | Performed by: DIETITIAN, REGISTERED

## 2019-05-09 PROCEDURE — 97803 MED NUTRITION INDIV SUBSEQ: CPT | Mod: S$GLB,,, | Performed by: DIETITIAN, REGISTERED

## 2019-05-09 PROCEDURE — 99499 UNLISTED E&M SERVICE: CPT | Mod: S$GLB,,, | Performed by: DIETITIAN, REGISTERED

## 2019-05-09 PROCEDURE — 99499 NO LOS: ICD-10-PCS | Mod: S$GLB,,, | Performed by: DIETITIAN, REGISTERED

## 2019-05-10 VITALS — HEIGHT: 68 IN | BODY MASS INDEX: 47.74 KG/M2 | WEIGHT: 315 LBS

## 2019-05-10 NOTE — PROGRESS NOTES
NUTRITION NOTE    Referring Physician: Babatunde Mcdaniel M.D.  Reason for MNT Referral: 3 months Medically Supervised Diet pending Gastric Band removal to Sleeve    Patient presents for 2nd visit for MSD with weight loss over the past month; total weight loss by making numerous dietary and lifestyle changes.    CLINICAL DATA:  58 y.o. male.    Current Weight: 345  lbs  Weight Change Since Initial Visit: Loss of 5.8 lbs  Ideal Body Weight:157 lbs  Body mass index is 52.43 kg/m².   .8 lbs    NUTRITIONAL NEEDS:  1842 Calories (using [place] St. Jeor Equation  BMR x AF 1.2 -1000 calories for wt loss)   Grams Protein (1.2-1.5 gms protein/kg IBW/d)      CURRENT DIET:  Unknown pt arrived late and did not return numerous phone calls to discuss diet    Vitamins / Minerals / Herbs:   Vitamin D    Food Allergies:   None reported    Social:  Swing shift  Alcohol: weekly.  Dirty martinis  Smoking: None.    ASSESSMENT:  Patient demonstrates some willingness to change lifestyle habits as evidenced by weight loss.    Doing fairly well with working on greatest challenges (sweets and skipping meals).    Barriers to Education:  none  Stage of Change:  determination    NUTRITION DIAGNOSIS:  Obesity related to Excessive carbohydrate intake and Physical inactivity as evidence by BMI.  Status: Improved    PLAN:  Pt is potential candidate for surgery.    Unable to discus plan due to patient being 20 minutes late for appointment and pt not returning numerous phone calls to discuss goals and recommendations    Return to clinic in one month.  Needs additional visit(s) with RD.    Communicated nutrition plan with bariatric team.    SESSION TIME:  30 minutes

## 2019-05-21 ENCOUNTER — PATIENT MESSAGE (OUTPATIENT)
Dept: BARIATRICS | Facility: CLINIC | Age: 59
End: 2019-05-21

## 2019-06-13 ENCOUNTER — PATIENT MESSAGE (OUTPATIENT)
Dept: BARIATRICS | Facility: CLINIC | Age: 59
End: 2019-06-13

## 2019-06-13 ENCOUNTER — TELEPHONE (OUTPATIENT)
Dept: BARIATRICS | Facility: CLINIC | Age: 59
End: 2019-06-13

## 2019-06-13 NOTE — TELEPHONE ENCOUNTER
Called pt and left VM for pt to call back and set up next nutrition visit.  Left contact information

## 2019-06-16 ENCOUNTER — PATIENT MESSAGE (OUTPATIENT)
Dept: BARIATRICS | Facility: CLINIC | Age: 59
End: 2019-06-16

## 2019-06-18 ENCOUNTER — TELEPHONE (OUTPATIENT)
Dept: BARIATRICS | Facility: CLINIC | Age: 59
End: 2019-06-18

## 2019-06-18 NOTE — TELEPHONE ENCOUNTER
Pt did not read email appointment today and therefore did not show up for appointment.  Called pt today to reschedule.  No answer, left  with contact information for pt to call to reschedule.

## 2019-06-19 ENCOUNTER — TELEPHONE (OUTPATIENT)
Dept: BARIATRICS | Facility: CLINIC | Age: 59
End: 2019-06-19

## 2019-06-19 NOTE — TELEPHONE ENCOUNTER
Called patient in regards to his message.    Patient did not  RX medications states he needed them transferred to [facility].    Called in RX to [facility] at the [facility] on [address].

## 2019-06-19 NOTE — TELEPHONE ENCOUNTER
----- Message from Rebecaanna Diaz sent at 6/19/2019  2:22 PM CDT -----  Reason for call:Pt states he would like all of his medications to be sent to [facility], [phone]        Communication Preference:[phone]    Additional Information:

## 2019-06-26 ENCOUNTER — CLINICAL SUPPORT (OUTPATIENT)
Dept: BARIATRICS | Facility: CLINIC | Age: 59
End: 2019-06-26
Payer: COMMERCIAL

## 2019-06-26 VITALS — BODY MASS INDEX: 47.74 KG/M2 | HEIGHT: 68 IN | WEIGHT: 315 LBS

## 2019-06-26 DIAGNOSIS — Z71.3 NUTRITIONAL COUNSELING: ICD-10-CM

## 2019-06-26 DIAGNOSIS — E66.01 MORBID OBESITY WITH BODY MASS INDEX OF 50 OR HIGHER: ICD-10-CM

## 2019-06-26 DIAGNOSIS — I10 ESSENTIAL HYPERTENSION: ICD-10-CM

## 2019-06-26 PROCEDURE — 99999 PR PBB SHADOW E&M-EST. PATIENT-LVL II: CPT | Mod: PBBFAC,,, | Performed by: DIETITIAN, REGISTERED

## 2019-06-26 PROCEDURE — 99499 UNLISTED E&M SERVICE: CPT | Mod: S$GLB,,, | Performed by: DIETITIAN, REGISTERED

## 2019-06-26 PROCEDURE — 99499 NO LOS: ICD-10-PCS | Mod: S$GLB,,, | Performed by: DIETITIAN, REGISTERED

## 2019-06-26 PROCEDURE — 97803 MED NUTRITION INDIV SUBSEQ: CPT | Mod: S$GLB,,, | Performed by: DIETITIAN, REGISTERED

## 2019-06-26 PROCEDURE — 99999 PR PBB SHADOW E&M-EST. PATIENT-LVL II: ICD-10-PCS | Mod: PBBFAC,,, | Performed by: DIETITIAN, REGISTERED

## 2019-06-26 PROCEDURE — 97803 PR MED NUTR THER, SUBSQ, INDIV, EA 15 MIN: ICD-10-PCS | Mod: S$GLB,,, | Performed by: DIETITIAN, REGISTERED

## 2019-06-26 NOTE — PATIENT INSTRUCTIONS
Menu Plan: 800-1000 Calories;  grams of Protein    DAY 1     Breakfast  ½ cup 2% cottage cheese  ¼ cup fruit (no sugar added)    Snack  2% mozzarella string cheese  10 grapes    Lunch  2oz Lean hamburger or turkey carito  1 slice low-fat cheese  ¼ cup green beans    Snack  200 calorie low-carb protein drink (4 grams sugar or less)    Dinner  2oz chicken thigh  ¼ cup cooked spinach     Snack  Atkins bar (15g protein)      DAY 2    Breakfast  1 egg with 1oz shredded cheddar cheese and 2T salsa    Snack  200 calorie low-carb protein drink (4 grams sugar or less)    Lunch  Lettuce Wraps: 2oz sliced turkey, 1 slice low-fat Swiss cheese, tomato, and mustard wrapped in a Ronny lettuce leaf    Snack  ½ cup low-fat cottage cheese  Pear cup (no sugar added)    Dinner  2oz baked fish  ½ cup cooked broccoli    Snack  Sugar-free pudding cup      DAY 3    Breakfast   ½ cup low-fat ricotta cheese w/ Splenda to sam  ½ scoop Vanilla protein powder   ¼ cup fresh fruit    Snack  2% string cheese  6 unsalted almonds    Lunch  Tuna/Chicken Salad: 2oz canned tuna/chicken, 1 egg white, and 1 tsp light hinojosa  Pineapple cup (no sugar added)    Snack  200 calorie low-carb protein drink (4 grams sugar or less)    Dinner  ½ baked pork chop   ¼ cup beans      DAY 4    Breakfast  200 calorie low-carb protein drink (4 grams sugar or less)    Snack  Boiled egg    Lunch  ½ cup grilled shrimp  Salad w/ 2 tbsp crumbled fat-free feta  1 tbsp light vinaigrette    Snack  200 calorie low-carb protein drink (4 grams sugar or less)    Dinner  ¾ cup red beans    Snack  Mini Babybell light      DAY 5    Breakfast  [place] pie: 3oz Greek yogurt, 1 tbsp Splenda, ½ individual pack Crystal Light lemonade. Top with ¼ cup chopped walnuts     Snack  3-4 lean ham or turkey slices, ¼ - ½ cup fruit    Lunch  Fiesta Chicken: 2oz canned chicken, 1oz shredded cheddar cheese, ¼ cup black beans  Top with 2 tbsp salsa and a small dollop light sour  cream    Snack  200 calorie low-carb protein drink (4 grams sugar or less)    Dinner  Omelette: ¼ cup Egg Beaters, 4 large (1oz) shrimp, 1oz shredded low-fat cheese. Add bell pepper, onion, mushrooms, green onions, or salsa, optional.      DAY 6    Breakfast  1 chantal or 2 links turkey sausage  ½ cup fruit    Snack  200 calorie low-carb protein drink (4 grams sugar or less)    Lunch  Grilled tilapia  Salad of baby spinach leaves with light dressing    Snack  200 calorie low-carb protein drink (4 grams sugar or less)    Dinner  Chicken thigh simmered in 98% fat free cream of mushroom soup  ½ cup cooked green beans  Healthy Eating on the go ~ Pre and Post-Surgery     (*) Means this meal is appropriate for pre-surgery consumption because it is >30g of protein per meal. Post-surgery, may want to split meal in half or save a small portion for a snack.     Claudias Kitchen  Item  Calories  Protein (g)   Mediterranean Lamb Kafta  350 22   *Chicken Kabobs 290 41   *Marston Kabobs 330 40   Shrimp Kabobs 170 23   *Steak Kabobs 490 42   *Cauliflower Rice Bowl- chicken (salmon, lamb)  490 41   Mediterranean Chicken 260 34   *Protein Power Plate 520 41   Side items: Greek side salad, fruit salad, roasted vegetables, baked falafel       Huffmans   Item  Calories Protein (g)   Artisan Grilled Chicken Doug[place], no bun  <380 36   *[place] Grilled Chicken Salad, no dressing <320 42   Double Hamburger, no bun <440 25   Side items: apple slices, side salad       Kenyas   Item Calories Protein (g)   Grilled Chicken [place], no bun  <370 34   [place] Chicken Salad, half size, no dressing 320  22   Apple Pecan Chicken Salad, half size, no dressing  340 20   Large Chili 250 21   Side items: garden or caesar side salad (no croutons), apple bites       Burger Abdias   Item Calories Protein (g)   Grilled Chicken Douglass, no bun <470 37   Whopper Jr., no bun <310 13   Double Hamburger, no bun  <390 23   *Chicken Garden Salad, no croutons,  use ½ packet of dressing  <520 40   Side items: garden side salad,         Chick-madelin-A  Item Calories Protein (g)   Grilled Chicken Weldon, no bun  <310 29   Grilled Nuggets, 8 count 140 25   Grilled Chicken Market Salad with light balsamic dressing 330 28   Small Chicken Tortilla Soup, no tortilla strips 340 23   Side items: side salad, superfood side salad, carrot raisin salad, fruit cup,          Sonic  Item Calories Protein (g)   Jr. Choi, no bun  <330 15   Classic Grilled Chicken [place], no bun <490 31   Hearty [place], no fritos 140 10       Ronnies   Item Calories Protein (g)   Blackened Chicken Tenders, 3 piece 170 26   Side items: green beans             Derrell Perez   Item Calories Protein (g)   Unwich: any sandwich made wrapped in lettuce instead of bread. Ask for no hinojosa.      Original Roast Beef Unwich 260 16   [place] Breast Unwich 230 14   Perfect Divehi Unwich 340 22   Ham and Provolone Unwich 350 19   Tuna Salad Unwich 280 11   The Veggie Unwich 410 17   Side items: dill pickle          Chipotle  Item Calories Protein (g)   *Salad with your choice of meat, beans, fresh tomato salsa, fajita veggies, and guacamole (NO rice) ~375 ~40        Applebees  Item Calories Protein (g)   Grilled Chicken Breast 290 38   Angolan Shrimp Salad, no wonton strips, use ½ dressing <390 26   Blackened Shrimp Caesar Salad, no croutons, use ½ dressing  <660 25   *Grilled Chicken Caesar Salad, no croutons, use ½ dressing <770 47   [place] Radiant with Maple Mustard Glaze 350 37   Side Grilled Shrimp Skewer 110 27   Side items: steamed broccoli, garlicky green beans,               [employer]  Item Calories Protein (g)   *Spinach & Mushroom Omelette, no hollandaise sauce  <890 46   *Garden Omelette 840 46   Egg White Vegetable Omelette 380 29   *Grilled Chicken & Veggie Salad, use ½ dressing  <680 38     Olive Garden   Item Calories Protein (g)   *Herb-Grilled Radiant 460 45   House salad with, no croutons. (Add grilled chicken or  shrimp) <400 25   Side items: steamed broccoli       Walk Ons   Tuna Tini 410 30   Karen Harper 330 14   Chicken Berry Pecan Salad      Side items: seasonal fruit, broccoli, green beans side salad

## 2019-06-26 NOTE — PROGRESS NOTES
NUTRITION NOTE    Referring Physician: Babatunde Mcdaniel M.D.  Reason for MNT Referral: 3 months Medically Supervised Diet pending Gastric band to sleeve    Patient presents for 3rd visit for MSD with weight gain over the past month; Pt is living with his mother currently.  He is drinking sodas from his mother's house and is not meal prepping or making an effort to decrease fast foods or unhealthy foods in his oral intake    CLINICAL DATA:  58 y.o. male.    Current Weight: 352  lbs  Weight Change Since Initial Visit: Gain of 2 lbs  Ideal Body Weight: 157 lbs  There is no height or weight on file to calculate BMI.   .8 lbs  Last wt 345 lbs    DAILY NUTRITIONAL NEEDS:  Bariatric Guidelines  800-1000 Calories (using [place] Jeor Equation)   Grams Protein    CURRENT DIET:  Regular diet.  Diet Recall: Food records are not present.  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  Protein Supplements: 0 per day.    Beverages: water and soda  Dining Out:  Daily. Mostly fast food and take-out.  Cooking at home: Never. Mostly none none.    CURRENT EXERCISE:  Adequate  Walk three times a week    Vitamins / Minerals / Herbs:   Vitamin D    Food Allergies:   None reported    Social:  shift  Alcohol: weekly martinis.  Smoking: None.    ASSESSMENT:  Patient demonstrates no  willingness to change lifestyle habits as evidenced by weight gain, no food logs and drinking sodas and not.    Doing poorly with working on greatest challenges (dining out frequency and irregular meal patterns).    Barriers to Education:  none  Stage of Change:  contemplation    NUTRITION DIAGNOSIS:  Obesity related to Excessive carbohydrate intake, Excessive calorie intake and Inadequate protein intake as evidence by  BMI.  Status: Same    PLAN:  Pt is potential candidate for surgery.    Diet: Adjust diet plan.  Work on Bariatric Nutrition Checklist.  Work on gradually cutting back on starchy CHO in the diet.  Start including protein supplements in the diet plan daily.  Reduce frequency of dining out.  More grocery shopping and meal preparation at home.  Return to clinic.  800-1000 calories per day with  gms of protein per day    Exercise: Maintain.    Behavior Modification: Begin to document food & activity logs daily.      Return to clinic in one month.  Pt to call with appointment time for psych to coordinate next nutrition visit on same day.  Needs additional visit(s) with RD.    Communicated nutrition plan with bariatric team.    SESSION TIME:  30 minutes

## 2019-09-10 ENCOUNTER — HOSPITAL ENCOUNTER (EMERGENCY)
Facility: HOSPITAL | Age: 59
Discharge: HOME | End: 2019-09-10
Attending: EMERGENCY MEDICINE
Payer: COMMERCIAL

## 2019-09-10 VITALS
HEART RATE: 60 BPM | HEIGHT: 68 IN | RESPIRATION RATE: 18 BRPM | OXYGEN SATURATION: 96 % | DIASTOLIC BLOOD PRESSURE: 92 MMHG | BODY MASS INDEX: 42.44 KG/M2 | SYSTOLIC BLOOD PRESSURE: 171 MMHG | WEIGHT: 280 LBS | TEMPERATURE: 98 F

## 2019-09-10 DIAGNOSIS — R06.09 EXERTIONAL DYSPNEA: Primary | ICD-10-CM

## 2019-09-10 DIAGNOSIS — R60.0 LOCALIZED EDEMA: ICD-10-CM

## 2019-09-10 DIAGNOSIS — R07.9 CHEST PAIN: ICD-10-CM

## 2019-09-10 LAB
ALBUMIN SERPL BCP-MCNC: 3.4 G/DL (ref 3.5–5.2)
ALP SERPL-CCNC: 74 U/L (ref 55–135)
ALT SERPL W/O P-5'-P-CCNC: 18 U/L (ref 10–44)
ANION GAP SERPL CALC-SCNC: 11 MMOL/L (ref 8–16)
AST SERPL-CCNC: 24 U/L (ref 10–40)
BASOPHILS # BLD AUTO: 0.06 K/UL (ref 0–0.2)
BASOPHILS NFR BLD: 1 % (ref 0–1.9)
BILIRUB SERPL-MCNC: 0.3 MG/DL (ref 0.1–1)
BNP SERPL-MCNC: 18 PG/ML (ref 0–99)
BUN SERPL-MCNC: 13 MG/DL (ref 6–20)
CALCIUM SERPL-MCNC: 9.4 MG/DL (ref 8.7–10.5)
CHLORIDE SERPL-SCNC: 104 MMOL/L (ref 95–110)
CO2 SERPL-SCNC: 28 MMOL/L (ref 23–29)
CREAT SERPL-MCNC: 1 MG/DL (ref 0.5–1.4)
DIFFERENTIAL METHOD: ABNORMAL
EOSINOPHIL # BLD AUTO: 0.1 K/UL (ref 0–0.5)
EOSINOPHIL NFR BLD: 2.4 % (ref 0–8)
ERYTHROCYTE [DISTWIDTH] IN BLOOD BY AUTOMATED COUNT: 15.6 % (ref 11.5–14.5)
EST. GFR  (AFRICAN AMERICAN): >60 ML/MIN/1.73 M^2
EST. GFR  (NON AFRICAN AMERICAN): >60 ML/MIN/1.73 M^2
GLUCOSE SERPL-MCNC: 125 MG/DL (ref 70–110)
HCT VFR BLD AUTO: 45.4 % (ref 40–54)
HGB BLD-MCNC: 13.5 G/DL (ref 14–18)
IMM GRANULOCYTES # BLD AUTO: 0.02 K/UL (ref 0–0.04)
IMM GRANULOCYTES NFR BLD AUTO: 0.3 % (ref 0–0.5)
LYMPHOCYTES # BLD AUTO: 2 K/UL (ref 1–4.8)
LYMPHOCYTES NFR BLD: 33.5 % (ref 18–48)
MCH RBC QN AUTO: 24.8 PG (ref 27–31)
MCHC RBC AUTO-ENTMCNC: 29.7 G/DL (ref 32–36)
MCV RBC AUTO: 84 FL (ref 82–98)
MONOCYTES # BLD AUTO: 0.5 K/UL (ref 0.3–1)
MONOCYTES NFR BLD: 8.6 % (ref 4–15)
NEUTROPHILS # BLD AUTO: 3.2 K/UL (ref 1.8–7.7)
NEUTROPHILS NFR BLD: 54.2 % (ref 38–73)
NRBC BLD-RTO: 0 /100 WBC
PLATELET # BLD AUTO: 301 K/UL (ref 150–350)
PMV BLD AUTO: 10.4 FL (ref 9.2–12.9)
POTASSIUM SERPL-SCNC: 3.9 MMOL/L (ref 3.5–5.1)
PROT SERPL-MCNC: 8 G/DL (ref 6–8.4)
RBC # BLD AUTO: 5.44 M/UL (ref 4.6–6.2)
SODIUM SERPL-SCNC: 143 MMOL/L (ref 136–145)
TROPONIN I SERPL DL<=0.01 NG/ML-MCNC: 0.01 NG/ML (ref 0–0.03)
WBC # BLD AUTO: 5.91 K/UL (ref 3.9–12.7)

## 2019-09-10 PROCEDURE — 25000003 PHARM REV CODE 250: Performed by: EMERGENCY MEDICINE

## 2019-09-10 PROCEDURE — 36000 PLACE NEEDLE IN VEIN: CPT

## 2019-09-10 PROCEDURE — 99284 EMERGENCY DEPT VISIT MOD MDM: CPT | Mod: ,,, | Performed by: EMERGENCY MEDICINE

## 2019-09-10 PROCEDURE — 99285 EMERGENCY DEPT VISIT HI MDM: CPT | Mod: 25

## 2019-09-10 PROCEDURE — 93010 ELECTROCARDIOGRAM REPORT: CPT | Mod: ,,, | Performed by: INTERNAL MEDICINE

## 2019-09-10 PROCEDURE — 83880 ASSAY OF NATRIURETIC PEPTIDE: CPT

## 2019-09-10 PROCEDURE — 84484 ASSAY OF TROPONIN QUANT: CPT

## 2019-09-10 PROCEDURE — 93010 EKG 12-LEAD: ICD-10-PCS | Mod: ,,, | Performed by: INTERNAL MEDICINE

## 2019-09-10 PROCEDURE — 85025 COMPLETE CBC W/AUTO DIFF WBC: CPT

## 2019-09-10 PROCEDURE — 99284 PR EMERGENCY DEPT VISIT,LEVEL IV: ICD-10-PCS | Mod: ,,, | Performed by: EMERGENCY MEDICINE

## 2019-09-10 PROCEDURE — 80053 COMPREHEN METABOLIC PANEL: CPT

## 2019-09-10 PROCEDURE — 93005 ELECTROCARDIOGRAM TRACING: CPT

## 2019-09-10 RX ORDER — ASPIRIN 325 MG
325 TABLET ORAL
Status: COMPLETED | OUTPATIENT
Start: 2019-09-10 | End: 2019-09-10

## 2019-09-10 RX ORDER — HYDROCHLOROTHIAZIDE 25 MG/1
12.5 TABLET ORAL DAILY
Qty: 15 TABLET | Refills: 0 | OUTPATIENT
Start: 2019-09-10 | End: 2021-02-21

## 2019-09-10 RX ADMIN — ASPIRIN 325 MG ORAL TABLET 325 MG: 325 PILL ORAL at 03:09

## 2019-09-10 NOTE — ED PROVIDER NOTES
Encounter Date: 9/10/2019    SCRIBE #1 NOTE: I, Susanne Ntahan, am scribing for, and in the presence of,  Dr. Noriega. I have scribed the following portions of the note - Other sections scribed: HPI, ROS, PE.       History     Chief Complaint   Patient presents with    Leg Swelling     x 1 week to bilateral legs. On fluid pill.      Time patient was seen by the provider: 3:10 PM      The patient is a 58 y.o. male with past medical history of HTN, anxiety, arthritis, gout, who presents to the ED with a complaint of worsening bilateral lower extremity swelling for 2 weeks. The patient is on diuretics. He also notes of associated SOB on exertion. A month ago he was able to walk a long distance and go up a flight of stairs without any difficulty. Now with SOB after going up a flight of stairs or when walking a short distance. No SOB when at rest. Denies any fever, chills, chest pain. Patient is a non smoker.     The history is provided by the patient and medical records.     Review of patient's allergies indicates:  No Known Allergies  Past Medical History:   Diagnosis Date    Anxiety     Arthritis     Gout     Hypertension      Past Surgical History:   Procedure Laterality Date    ANTERIOR CRUCIATE LIGAMENT REPAIR      LAPAROSCOPIC GASTRIC BANDING      2009/[place]     Family History   Problem Relation Age of Onset    Hypertension Mother     Alcohol abuse Father     No Known Problems Sister     No Known Problems Brother     No Known Problems Son     No Known Problems Sister      Social History     Tobacco Use    Smoking status: Never Smoker    Smokeless tobacco: Never Used   Substance Use Topics    Alcohol use: Yes     Comment: occasionally    Drug use: No     Review of Systems   Constitutional: Negative for chills and fever.   HENT: Negative for sore throat.    Respiratory: Positive for shortness of breath.    Cardiovascular: Positive for leg swelling. Negative for chest pain.   Gastrointestinal:  Negative for nausea.   Genitourinary: Negative for dysuria.   Musculoskeletal: Negative for back pain.   Skin: Negative for rash.   Neurological: Negative for weakness.   Hematological: Does not bruise/bleed easily.       Physical Exam     Initial Vitals [09/10/19 1431]   BP Pulse Resp Temp SpO2   (!) 182/99 84 18 98.3 °F (36.8 °C) 97 %      MAP       --         Physical Exam    Nursing note and vitals reviewed.    Gen: AxOx3, NAD, well nourished  HEENT: NCAT, EOMI, OP clear, neck supple with FROM  CVS: RRR, no m/r/g, distal pulses intact/symmetric  Pulm: Tachypneic (though patient reports he feels like he is breathing normally), difficult to auscultate due to body habitus, lungs are clear, no wheezes, rales or rhonchi, no increased work of breathing  Abd: soft, nontender, nondistended, no organomegaly, no CVAT  Ext: Pitting edema up to the knees, lesions, rashes, or deformity  Neuro: GCS15, moving all extremities, gait intact  Psych: normal affect, cooperative    ED Course   Procedures  Labs Reviewed   CBC W/ AUTO DIFFERENTIAL - Abnormal; Notable for the following components:       Result Value    Hemoglobin 13.5 (*)     Mean Corpuscular Hemoglobin 24.8 (*)     Mean Corpuscular Hemoglobin Conc 29.7 (*)     RDW 15.6 (*)     All other components within normal limits   COMPREHENSIVE METABOLIC PANEL - Abnormal; Notable for the following components:    Glucose 125 (*)     Albumin 3.4 (*)     All other components within normal limits   TROPONIN I   B-TYPE NATRIURETIC PEPTIDE   TROPONIN I     EKG Readings: (Independently Interpreted)   Initial Reading: No STEMI.   Unchanged T wave inversion in L1 and AVL when compared to prior       Imaging Results          X-Ray Chest PA And Lateral (Final result)  Result time 09/10/19 15:50:56    Final result by Jonathan Martinez MD (09/10/19 15:50:56)                 Impression:      No acute chest disease identified.      Electronically signed by: Jonathan Martinez  MD  Date:    09/10/2019  Time:    15:50             Narrative:    EXAMINATION:  XR CHEST PA AND LATERAL    CLINICAL HISTORY:  Chest Pain;    TECHNIQUE:  PA and lateral views of the chest were performed.    COMPARISON:  04/18/2019.    FINDINGS:  The cardiac silhouette and superior mediastinal structures are unremarkable. Pulmonary vasculature is within normal limits. The lungs are well aerated and free of focal consolidations. There is no evidence for pneumothorax or pleural effusions. Bony structures are grossly intact.                                 Medical Decision Making:   History:   Old Medical Records: I decided to obtain old medical records.  Independently Interpreted Test(s):   I have ordered and independently interpreted X-rays - see prior notes.  I have ordered and independently interpreted EKG Reading(s) - see prior notes  Clinical Tests:   Lab Tests: Ordered and Reviewed  Radiological Study: Ordered and Reviewed  Medical Tests: Ordered and Reviewed            Scribe Attestation:   Scribe #1: I performed the above scribed service and the documentation accurately describes the services I performed. I attest to the accuracy of the note.    Attending Attestation:             Attending ED Notes:   Briefly this is a 58-year-old male who presents with new dyspnea on exertion.  His EKG is nonischemic and unchanged, I have low suspicion for ACS at this time.  He does have lower extremity edema, and given his body habitus, it is difficult to auscultate if there is pulmonary edema or not.  His vital signs are normal including a normal pulse oximetry.  It is possible that he has new congestive heart failure, though edema may also be due to increased sodium intake.  His BNP is normal, though this is limited in patients of his body habitus.  He is clinically stable, so I think even if he does have some occult congestive heart failure symptoms, he can be managed as an outpatient.  I instructed the patient to follow up  with primary care doctor, as he requires further testing such as an echocardiogram.  He voiced understanding of the plan and was discharged in good condition.             Clinical Impression:       ICD-10-CM ICD-9-CM   1. Chest pain R07.9 786.50                                Germaine Noriega MD  09/10/19 1707

## 2019-09-10 NOTE — ED TRIAGE NOTES
Patient presents with concerns for BLE edema x two weeks that is worsening. Patient also reports associated shortness of breath on exertion. Patient denies chest pain, dizziness, NVD, dysuria, or fevers. Denies changes in sensation.

## 2019-09-10 NOTE — DISCHARGE INSTRUCTIONS
You were seen in the emergency department with shortness of breath whenever you exert yourself.  Your labs and chest x-ray are normal, but you need to follow up with your primary care doctor for further testing, as there may be more treatment needed.  Take medications as prescribed, and be careful about your salt intake.

## 2019-09-20 ENCOUNTER — TELEPHONE (OUTPATIENT)
Dept: ENDOSCOPY | Facility: HOSPITAL | Age: 59
End: 2019-09-20

## 2019-12-06 ENCOUNTER — TELEPHONE (OUTPATIENT)
Dept: ENDOSCOPY | Facility: HOSPITAL | Age: 59
End: 2019-12-06

## 2020-03-09 ENCOUNTER — DOCUMENTATION ONLY (OUTPATIENT)
Dept: BARIATRICS | Facility: CLINIC | Age: 60
End: 2020-03-09

## 2020-05-08 ENCOUNTER — HOSPITAL ENCOUNTER (EMERGENCY)
Facility: HOSPITAL | Age: 60
Discharge: HOME | End: 2020-05-09
Attending: EMERGENCY MEDICINE
Payer: COMMERCIAL

## 2020-05-08 DIAGNOSIS — R42 DIZZINESS: ICD-10-CM

## 2020-05-08 DIAGNOSIS — R04.0 NOSEBLEED: Primary | ICD-10-CM

## 2020-05-08 DIAGNOSIS — I10 ESSENTIAL HYPERTENSION: ICD-10-CM

## 2020-05-08 LAB
ALBUMIN SERPL BCP-MCNC: 3.5 G/DL (ref 3.5–5.2)
ALP SERPL-CCNC: 69 U/L (ref 55–135)
ALT SERPL W/O P-5'-P-CCNC: 14 U/L (ref 10–44)
ANION GAP SERPL CALC-SCNC: 11 MMOL/L (ref 8–16)
AST SERPL-CCNC: 18 U/L (ref 10–40)
BILIRUB SERPL-MCNC: 0.2 MG/DL (ref 0.1–1)
BUN SERPL-MCNC: 19 MG/DL (ref 8–23)
CALCIUM SERPL-MCNC: 9.5 MG/DL (ref 8.7–10.5)
CHLORIDE SERPL-SCNC: 101 MMOL/L (ref 95–110)
CO2 SERPL-SCNC: 29 MMOL/L (ref 23–29)
CREAT SERPL-MCNC: 1.3 MG/DL (ref 0.5–1.4)
EST. GFR  (AFRICAN AMERICAN): >60 ML/MIN/1.73 M^2
EST. GFR  (NON AFRICAN AMERICAN): 58.9 ML/MIN/1.73 M^2
GLUCOSE SERPL-MCNC: 72 MG/DL (ref 70–110)
POTASSIUM SERPL-SCNC: 3.8 MMOL/L (ref 3.5–5.1)
PROT SERPL-MCNC: 8.4 G/DL (ref 6–8.4)
SODIUM SERPL-SCNC: 141 MMOL/L (ref 136–145)

## 2020-05-08 PROCEDURE — 99284 EMERGENCY DEPT VISIT MOD MDM: CPT | Mod: ,,, | Performed by: EMERGENCY MEDICINE

## 2020-05-08 PROCEDURE — 85025 COMPLETE CBC W/AUTO DIFF WBC: CPT

## 2020-05-08 PROCEDURE — 93010 EKG 12-LEAD: ICD-10-PCS | Mod: ,,, | Performed by: INTERNAL MEDICINE

## 2020-05-08 PROCEDURE — 93010 ELECTROCARDIOGRAM REPORT: CPT | Mod: ,,, | Performed by: INTERNAL MEDICINE

## 2020-05-08 PROCEDURE — 99284 EMERGENCY DEPT VISIT MOD MDM: CPT | Mod: 25

## 2020-05-08 PROCEDURE — 99284 PR EMERGENCY DEPT VISIT,LEVEL IV: ICD-10-PCS | Mod: ,,, | Performed by: EMERGENCY MEDICINE

## 2020-05-08 PROCEDURE — 25000003 PHARM REV CODE 250: Performed by: STUDENT IN AN ORGANIZED HEALTH CARE EDUCATION/TRAINING PROGRAM

## 2020-05-08 PROCEDURE — 80053 COMPREHEN METABOLIC PANEL: CPT

## 2020-05-08 PROCEDURE — 93005 ELECTROCARDIOGRAM TRACING: CPT

## 2020-05-08 PROCEDURE — 86901 BLOOD TYPING SEROLOGIC RH(D): CPT

## 2020-05-08 RX ORDER — AMLODIPINE BESYLATE 10 MG/1
10 TABLET ORAL
Status: COMPLETED | OUTPATIENT
Start: 2020-05-08 | End: 2020-05-08

## 2020-05-08 RX ADMIN — AMLODIPINE BESYLATE 10 MG: 10 TABLET ORAL at 11:05

## 2020-05-09 VITALS
HEART RATE: 64 BPM | DIASTOLIC BLOOD PRESSURE: 102 MMHG | SYSTOLIC BLOOD PRESSURE: 158 MMHG | WEIGHT: 280 LBS | RESPIRATION RATE: 20 BRPM | TEMPERATURE: 99 F | HEIGHT: 68 IN | OXYGEN SATURATION: 96 % | BODY MASS INDEX: 42.44 KG/M2

## 2020-05-09 LAB
ABO + RH BLD: NORMAL
BASOPHILS # BLD AUTO: 0.07 K/UL (ref 0–0.2)
BASOPHILS NFR BLD: 1 % (ref 0–1.9)
BLD GP AB SCN CELLS X3 SERPL QL: NORMAL
DIFFERENTIAL METHOD: ABNORMAL
EOSINOPHIL # BLD AUTO: 0.2 K/UL (ref 0–0.5)
EOSINOPHIL NFR BLD: 3.1 % (ref 0–8)
ERYTHROCYTE [DISTWIDTH] IN BLOOD BY AUTOMATED COUNT: 14.8 % (ref 11.5–14.5)
HCT VFR BLD AUTO: 46.4 % (ref 40–54)
HGB BLD-MCNC: 13.6 G/DL (ref 14–18)
IMM GRANULOCYTES # BLD AUTO: 0.01 K/UL (ref 0–0.04)
IMM GRANULOCYTES NFR BLD AUTO: 0.1 % (ref 0–0.5)
LYMPHOCYTES # BLD AUTO: 2.8 K/UL (ref 1–4.8)
LYMPHOCYTES NFR BLD: 41.9 % (ref 18–48)
MCH RBC QN AUTO: 25.4 PG (ref 27–31)
MCHC RBC AUTO-ENTMCNC: 29.3 G/DL (ref 32–36)
MCV RBC AUTO: 87 FL (ref 82–98)
MONOCYTES # BLD AUTO: 0.5 K/UL (ref 0.3–1)
MONOCYTES NFR BLD: 7.8 % (ref 4–15)
NEUTROPHILS # BLD AUTO: 3.1 K/UL (ref 1.8–7.7)
NEUTROPHILS NFR BLD: 46.1 % (ref 38–73)
NRBC BLD-RTO: 0 /100 WBC
PLATELET # BLD AUTO: 291 K/UL (ref 150–350)
PMV BLD AUTO: 10.3 FL (ref 9.2–12.9)
RBC # BLD AUTO: 5.36 M/UL (ref 4.6–6.2)
WBC # BLD AUTO: 6.68 K/UL (ref 3.9–12.7)

## 2020-05-09 PROCEDURE — 25000003 PHARM REV CODE 250: Performed by: STUDENT IN AN ORGANIZED HEALTH CARE EDUCATION/TRAINING PROGRAM

## 2020-05-09 RX ORDER — OXYMETAZOLINE HCL 0.05 %
1 SPRAY, NON-AEROSOL (ML) NASAL
Status: COMPLETED | OUTPATIENT
Start: 2020-05-09 | End: 2020-05-09

## 2020-05-09 RX ADMIN — Medication 1 SPRAY: at 12:05

## 2020-05-09 NOTE — ED PROVIDER NOTES
"Encounter Date: 5/8/2020       History     Chief Complaint   Patient presents with    nose bleeding     pt was watching TV and went to the bathroom and started to bleed. 2nd time it happened to him. states he took his BP medication today. reports feeling lightheaded. states " he lost a lot of blood". denies chestpain/sob/cough/col     HPI   59 year old male with HTN, anxiety, and gout presents with nosebleed.  Patient reports his nosebleed began spontaneously at 8pm today while he was watching TV. He denies recent trauma.  He does not take any blood thinning medication.  He has not taken his antihypertensive medication today.  He states the bleeding has slowed down since it first started but there is still a trickle of blood.  He has never had a spontaneous nosebleed like this before.  He denies any associated headache, lightheadedness, dizziness, pallor, or chest pain.    Review of patient's allergies indicates:  No Known Allergies  Past Medical History:   Diagnosis Date    Anxiety     Arthritis     Gout     Hypertension      Past Surgical History:   Procedure Laterality Date    ANTERIOR CRUCIATE LIGAMENT REPAIR      LAPAROSCOPIC GASTRIC BANDING      [facility]     Family History   Problem Relation Age of Onset    Hypertension Mother     Alcohol abuse Father     No Known Problems Sister     No Known Problems Brother     No Known Problems Son     No Known Problems Sister      Social History     Tobacco Use    Smoking status: Never Smoker    Smokeless tobacco: Never Used   Substance Use Topics    Alcohol use: Yes     Comment: occasionally    Drug use: No     Review of Systems   Constitutional: Negative for chills, diaphoresis, fatigue and fever.   HENT: Positive for nosebleeds. Negative for congestion, rhinorrhea and sore throat.    Eyes: Negative for visual disturbance.   Respiratory: Negative for cough, shortness of breath and wheezing.    Cardiovascular: Negative for chest pain and palpitations. "   Gastrointestinal: Negative for abdominal pain, constipation, diarrhea, nausea and vomiting.   Genitourinary: Negative for dysuria.   Musculoskeletal: Negative for gait problem and myalgias.   Skin: Negative for pallor, rash and wound.   Neurological: Negative for dizziness, tremors, seizures, syncope, facial asymmetry, weakness, light-headedness, numbness and headaches.   Hematological: Does not bruise/bleed easily.   Psychiatric/Behavioral: The patient is not nervous/anxious.        Physical Exam     Initial Vitals [05/08/20 2217]   BP Pulse Resp Temp SpO2   (!) 193/109 80 20 (!) 100.4 °F (38 °C) 95 %      MAP       --         Physical Exam    Nursing note and vitals reviewed.  Constitutional: He appears well-developed and well-nourished. He is not diaphoretic. No distress.   Obese  male sitting in stretcher in no acute distress.   HENT:   Head: Normocephalic and atraumatic.   Mouth/Throat: No oropharyngeal exudate.   Dried blood and clots in bilateral nares.  Slow trickle of dark red blood from left nare when leaning forward.     Eyes: Conjunctivae and EOM are normal. Pupils are equal, round, and reactive to light.   Neck: Normal range of motion.   Cardiovascular: Normal rate, regular rhythm, normal heart sounds and intact distal pulses. Exam reveals no gallop and no friction rub.    No murmur heard.  Pulmonary/Chest: Breath sounds normal. No respiratory distress. He has no wheezes. He has no rhonchi. He has no rales. He exhibits no tenderness.   Abdominal: Soft. Bowel sounds are normal. He exhibits no distension and no mass. There is no tenderness. There is no guarding.   Musculoskeletal: Normal range of motion. He exhibits no edema or tenderness.   Neurological: He is alert and oriented to person, place, and time. GCS score is 15. GCS eye subscore is 4. GCS verbal subscore is 5. GCS motor subscore is 6.   Skin: Skin is warm and dry. Capillary refill takes less than 2 seconds. No rash noted. No  erythema. No pallor.   Psychiatric: He has a normal mood and affect. His behavior is normal. Judgment and thought content normal.         ED Course   Procedures  Labs Reviewed   CBC W/ AUTO DIFFERENTIAL   COMPREHENSIVE METABOLIC PANEL   TYPE & SCREEN          Imaging Results    None                APC / Resident Notes:   HO-5 MDM  59 year old male with HTN, anxiety, and gout presents with nosebleed.    DDx: anterior nosebleed, posterior nosebleed, laceration, hypertensive emergency    A/P: Based on history and physical exam, I believe the patient's nosebleed is secondary to his uncontrolled hypertension. Will give the patient his home amlodipine and have him pinch his nose and lean forward for 15 minutes.  No visible blood drainage in posterior oropharynx that would indicate posterior bleed.  No visible laceration of the nasal passages.  Will monitor closely in the emergency department and obtain labs to insure the patient has not have significant blood loss that would precipitate anemia.   Alexi Bellamy MD, PGY-5  [facility] Emergency Medicine/Pediatrics Resident  11:07 PM 05/09/2020    HO-5 update  Bleeding controlled with pressure.  Labs show no significant anemia or electrolyte abnormality.  Patient's blood pressure trending down after home amlodipine.  Will plan to discharge with extensive return precaution and instructions to follow-up with his primary care physician.  Alexi Bellamy MD, PGY-5  12:18 AM 05/09/2020                              Clinical Impression:       ICD-10-CM ICD-9-CM   1. Nosebleed R04.0 784.7   2. Dizziness R42 780.4   3. Essential hypertension I10 401.9                                Alexi Bellamy MD  Resident  05/09/20 0023

## 2020-05-09 NOTE — DISCHARGE INSTRUCTIONS
You were seen in the [facility] Emergency Department. Your final diagnosis is nosebleed secondary to high blood pressure. It is important you continue your blood pressure medication to prevent recurrent nosebleeds.    Please call your primary care physician or return to the Emergency Department if you have any loss of consciousness, bleeding, nausea, vomiting, fever >100.4, confusion, difficulty breathing, weakness, numbness, or any other concerning symptoms. The emergency department is always open and happy to see you for your concerns.

## 2020-05-09 NOTE — ED TRIAGE NOTES
"Pt reports L-sided nose bleed since 2030 without trauma. Denies blood thinner use. Reports Hx HTN; compliant with BP meds. Patient currently hypertensive 167/100. Reports lightheadedness; states "I lost a lot of blood." Denies cp, sob.  "

## 2020-08-05 ENCOUNTER — OFFICE VISIT (OUTPATIENT)
Dept: PODIATRY | Facility: CLINIC | Age: 60
End: 2020-08-05
Payer: COMMERCIAL

## 2020-08-05 ENCOUNTER — HOSPITAL ENCOUNTER (OUTPATIENT)
Dept: RADIOLOGY | Facility: CLINIC | Age: 60
Discharge: HOME | End: 2020-08-05
Attending: PODIATRIST
Payer: COMMERCIAL

## 2020-08-05 VITALS — HEIGHT: 68 IN | TEMPERATURE: 97 F | BODY MASS INDEX: 42.57 KG/M2 | RESPIRATION RATE: 16 BRPM

## 2020-08-05 DIAGNOSIS — M79.672 BILATERAL FOOT PAIN: ICD-10-CM

## 2020-08-05 DIAGNOSIS — M19.079 OSTEOARTHRITIS OF ANKLE OR FOOT, UNSPECIFIED LATERALITY: Primary | ICD-10-CM

## 2020-08-05 DIAGNOSIS — M10.00 IDIOPATHIC GOUT, UNSPECIFIED CHRONICITY, UNSPECIFIED SITE: ICD-10-CM

## 2020-08-05 DIAGNOSIS — M79.671 BILATERAL FOOT PAIN: ICD-10-CM

## 2020-08-05 PROCEDURE — 99203 PR OFFICE/OUTPT VISIT, NEW, LEVL III, 30-44 MIN: ICD-10-PCS | Mod: S$GLB,,, | Performed by: PODIATRIST

## 2020-08-05 PROCEDURE — 99203 OFFICE O/P NEW LOW 30 MIN: CPT | Mod: S$GLB,,, | Performed by: PODIATRIST

## 2020-08-05 PROCEDURE — 73630 XR FOOT COMPLETE 3 VIEW BILATERAL: ICD-10-PCS | Mod: 50,S$GLB,, | Performed by: PODIATRIST

## 2020-08-05 PROCEDURE — 73630 X-RAY EXAM OF FOOT: CPT | Mod: 50,S$GLB,, | Performed by: PODIATRIST

## 2020-08-05 RX ORDER — IBUPROFEN 800 MG/1
TABLET ORAL
COMMUNITY
Start: 2020-07-03 | End: 2021-02-21

## 2020-08-05 NOTE — PROGRESS NOTES
[address] [facility] [address]  [place] [address]  Phone: [phone]   [phone]    Patient's PCP:Humble Taylor MD  Referring Provider: No ref. provider found    Subjective:      Chief Complaint:: Foot Pain (bilateral)    KALA Florez is a 59 y.o. male who presents with a complaint of bilateral foot pain, currently more pain in right, lasting for about month. Onset of the symptoms was no trauma that pt can remember, but states that he has eaten seafood.  Current symptoms include swelling in both feet, throbbing pains.  Aggravating factors are prolonged walking/standing. Symptoms have not improved. Treatment to date have included saw Dr. Baugh with [facility] who told pt he has gout and Rx allopurinol and a steroid pack noticed no improvement. Patients rates pain 10/10 on pain scale.    Patient did state that he has noticed his feet became more painful he ate a sushi/tuna subway sandwich.  Patient states he does have a fluid pill however he does not take it because it makes him urinate.    Vitals:    08/05/20 1534   Resp: 16   Temp: 97.2 °F (36.2 °C)     Shoe Size:     Past Surgical History:   Procedure Laterality Date    ANTERIOR CRUCIATE LIGAMENT REPAIR      LAPAROSCOPIC GASTRIC BANDING      2009/Neon     Past Medical History:   Diagnosis Date    Anxiety     Arthritis     Gout     Hypertension      Family History   Problem Relation Age of Onset    Hypertension Mother     Alcohol abuse Father     No Known Problems Sister     No Known Problems Brother     No Known Problems Son     No Known Problems Sister         Social History:   Marital Status: Single  Alcohol History:  reports current alcohol use.  Tobacco History:  reports that he has never smoked. He has never used smokeless tobacco.  Drug History:  reports no history of drug use.    Review of patient's allergies indicates:  No Known Allergies    Current Outpatient Medications   Medication Sig Dispense Refill    allopurinol  (ZYLOPRIM) 100 MG tablet Take 1 tablet by mouth once daily.      amLODIPine (NORVASC) 10 MG tablet Take 10 mg by mouth once daily.      ergocalciferol (ERGOCALCIFEROL) 50,000 unit Cap Take 1 capsule (50,000 Units total) by mouth twice a week. 24 capsule 0    ibuprofen (ADVIL,MOTRIN) 800 MG tablet       paroxetine (PAXIL) 40 MG tablet Take 1 tablet by mouth once daily.      hydroCHLOROthiazide (HYDRODIURIL) 25 MG tablet Take 0.5 tablets (12.5 mg total) by mouth once daily. 15 tablet 0     No current facility-administered medications for this visit.        Review of Systems      Objective:        Physical Exam:   Foot Exam    General  General Appearance: appears stated age and healthy   Orientation: alert and oriented to person, place, and time   Affect: appropriate   Gait: unimpaired       Right Foot/Ankle     Inspection and Palpation  Ecchymosis: none  Tenderness: (Ankle joint)  Swelling: (Moderate edema to lower extremity)  Arch: pes planus  Hallux limitus: yes  Skin Exam: dry skin;     Neurovascular  Dorsalis pedis: 2+  Posterior tibial: 2+  Saphenous nerve sensation: normal  Tibial nerve sensation: normal  Superficial peroneal nerve sensation: normal  Deep peroneal nerve sensation: normal  Sural nerve sensation: normal    Muscle Strength  Ankle dorsiflexion: 5  Ankle plantar flexion: 5  Ankle inversion: 5  Ankle eversion: 5  Great toe extension: 5  Great toe flexion: 5    Range of Motion    Passive  Ankle dorsiflexion: 5, pain      Tests  Anterior drawer: negative   Talar tilt: negative   PT Tinel's sign: negative        Left Foot/Ankle      Inspection and Palpation  Ecchymosis: none  Tenderness: (Ankle joint)  Swelling: (Moderate edema to the lower extremity)  Arch: pes planus  Hallux limitus: yes  Skin Exam: dry skin;     Neurovascular  Dorsalis pedis: 2+  Posterior tibial: 2+  Saphenous nerve sensation: normal  Tibial nerve sensation: normal  Superficial peroneal nerve sensation: normal  Deep peroneal  nerve sensation: normal  Sural nerve sensation: normal    Muscle Strength  Ankle dorsiflexion: 5  Ankle plantar flexion: 5  Ankle inversion: 5  Ankle eversion: 5  Great toe extension: 5  Great toe flexion: 5    Range of Motion    Passive  Ankle dorsiflexion: 5, pain      Tests  Anterior drawer: negative   Talar tilt: negative   PT Tinel's sign: negative          Physical Exam   Cardiovascular:   Pulses:       Dorsalis pedis pulses are 2+ on the right side and 2+ on the left side.        Posterior tibial pulses are 2+ on the right side and 2+ on the left side.   Feet:   Right Foot:   Skin Integrity: Positive for dry skin.   Left Foot:   Skin Integrity: Positive for dry skin.       Imaging: X-ray 3 views of the left foot were taken AP, lateral, and oblique, weight bearing showing:  No fractures or dislocations.  There are significant degenerative changes noted at the 1st metatarsophalangeal joint with some areas of destruction of the 1st metatarsal head.  There is so seated osteophyte formation.  Mild narrowing of the ankle joint.  Scattered degenerative changes noted throughout the midfoot.  Posterior calcaneal osteophyte is present.      Electronically Signed by: Matteo Ashley DPM     X-ray 3 views of the right foot were taken AP, lateral, and oblique, weight bearing showing:  No fractures or dislocations.  There are significant degenerative changes noted at the 1st metatarsophalangeal joint with some areas of destruction of the 1st metatarsal head.  There is so seated osteophyte formation.  Mild narrowing of the ankle joint.  Scattered degenerative changes noted throughout the midfoot.  Posterior calcaneal osteophyte is present.      Electronically Signed by: Matteo Ashley DPM             Assessment:       1. Osteoarthritis of ankle or foot, unspecified laterality    2. Idiopathic gout, unspecified chronicity, unspecified site    3. Bilateral foot pain      Plan:   Osteoarthritis of ankle or foot, unspecified  "laterality    Idiopathic gout, unspecified chronicity, unspecified site  -     Uric acid; Future; Expected date: 08/05/2020    Bilateral foot pain  -     X-Ray Foot Complete Bilateral      Follow up if symptoms worsen or fail to improve.    Procedures - None    I discussed the patient that I believe gout may be contributing somewhat to his symptoms however I think that is only part of what is causing his issues.  He has been on the same dosage bowel pure and off for 2 years.  He states the pain a while since his last uric acid so I am ordering new test.  Also recommend "SB Sox" for gentle compression.    Counseling:     I provided patient education verbally regarding:   Patient diagnosis, treatment options, as well as alternatives, risks, and benefits.     I discussed the conservative treatent of arthritis consisiting of shoe modification, OTC NSAID, cortisone shots, a series of supartz injections, avoiding painful activities and common sense.  I explained that the arthritis may become more painful causng more disability and the possibility of further treatment.  Also discussed may need evaluation by Rheumatologist for possible inflammatory arthritis.    causes of gout, diet for gout and oral medication for gout.  I discussed lowering the uric acid level to below 6 and as close to 5.5 to stop uric acid crystal formation.      This note was created using Dragon voice recognition software that occasionally misinterpreted phrases or words.               "

## 2021-02-21 ENCOUNTER — HOSPITAL ENCOUNTER (EMERGENCY)
Facility: HOSPITAL | Age: 61
Discharge: HOME | End: 2021-02-21
Attending: EMERGENCY MEDICINE
Payer: COMMERCIAL

## 2021-02-21 VITALS
HEART RATE: 84 BPM | TEMPERATURE: 99 F | RESPIRATION RATE: 18 BRPM | DIASTOLIC BLOOD PRESSURE: 111 MMHG | SYSTOLIC BLOOD PRESSURE: 165 MMHG | OXYGEN SATURATION: 97 %

## 2021-02-21 DIAGNOSIS — M54.50 ACUTE BILATERAL LOW BACK PAIN WITHOUT SCIATICA: Primary | ICD-10-CM

## 2021-02-21 DIAGNOSIS — S46.819A TRAPEZIUS STRAIN, UNSPECIFIED LATERALITY, INITIAL ENCOUNTER: ICD-10-CM

## 2021-02-21 LAB
ANION GAP SERPL CALC-SCNC: 11 MMOL/L (ref 8–16)
BASOPHILS # BLD AUTO: 0.05 K/UL (ref 0–0.2)
BASOPHILS NFR BLD: 0.4 % (ref 0–1.9)
BUN SERPL-MCNC: 19 MG/DL (ref 8–23)
CALCIUM SERPL-MCNC: 10 MG/DL (ref 8.7–10.5)
CHLORIDE SERPL-SCNC: 102 MMOL/L (ref 95–110)
CO2 SERPL-SCNC: 26 MMOL/L (ref 23–29)
CREAT SERPL-MCNC: 1.3 MG/DL (ref 0.5–1.4)
DIFFERENTIAL METHOD: ABNORMAL
EOSINOPHIL # BLD AUTO: 0.1 K/UL (ref 0–0.5)
EOSINOPHIL NFR BLD: 0.5 % (ref 0–8)
ERYTHROCYTE [DISTWIDTH] IN BLOOD BY AUTOMATED COUNT: 15.4 % (ref 11.5–14.5)
EST. GFR  (AFRICAN AMERICAN): >60 ML/MIN/1.73 M^2
EST. GFR  (NON AFRICAN AMERICAN): 58.5 ML/MIN/1.73 M^2
GLUCOSE SERPL-MCNC: 85 MG/DL (ref 70–110)
HCT VFR BLD AUTO: 43.5 % (ref 40–54)
HGB BLD-MCNC: 13.2 G/DL (ref 14–18)
IMM GRANULOCYTES # BLD AUTO: 0.03 K/UL (ref 0–0.04)
IMM GRANULOCYTES NFR BLD AUTO: 0.3 % (ref 0–0.5)
LYMPHOCYTES # BLD AUTO: 1.4 K/UL (ref 1–4.8)
LYMPHOCYTES NFR BLD: 12.2 % (ref 18–48)
MAGNESIUM SERPL-MCNC: 1.9 MG/DL (ref 1.6–2.6)
MCH RBC QN AUTO: 24.4 PG (ref 27–31)
MCHC RBC AUTO-ENTMCNC: 30.3 G/DL (ref 32–36)
MCV RBC AUTO: 81 FL (ref 82–98)
MONOCYTES # BLD AUTO: 1 K/UL (ref 0.3–1)
MONOCYTES NFR BLD: 8.9 % (ref 4–15)
NEUTROPHILS # BLD AUTO: 9 K/UL (ref 1.8–7.7)
NEUTROPHILS NFR BLD: 77.7 % (ref 38–73)
NRBC BLD-RTO: 0 /100 WBC
PLATELET # BLD AUTO: 327 K/UL (ref 150–350)
PMV BLD AUTO: 10.9 FL (ref 9.2–12.9)
POTASSIUM SERPL-SCNC: 3.7 MMOL/L (ref 3.5–5.1)
RBC # BLD AUTO: 5.4 M/UL (ref 4.6–6.2)
SODIUM SERPL-SCNC: 139 MMOL/L (ref 136–145)
WBC # BLD AUTO: 11.57 K/UL (ref 3.9–12.7)

## 2021-02-21 PROCEDURE — 99284 EMERGENCY DEPT VISIT MOD MDM: CPT | Mod: ,,, | Performed by: EMERGENCY MEDICINE

## 2021-02-21 PROCEDURE — 86803 HEPATITIS C AB TEST: CPT

## 2021-02-21 PROCEDURE — 99284 EMERGENCY DEPT VISIT MOD MDM: CPT

## 2021-02-21 PROCEDURE — 83735 ASSAY OF MAGNESIUM: CPT

## 2021-02-21 PROCEDURE — 99284 PR EMERGENCY DEPT VISIT,LEVEL IV: ICD-10-PCS | Mod: ,,, | Performed by: EMERGENCY MEDICINE

## 2021-02-21 PROCEDURE — 80048 BASIC METABOLIC PNL TOTAL CA: CPT

## 2021-02-21 PROCEDURE — 25000003 PHARM REV CODE 250: Performed by: EMERGENCY MEDICINE

## 2021-02-21 PROCEDURE — 86703 HIV-1/HIV-2 1 RESULT ANTBDY: CPT

## 2021-02-21 PROCEDURE — 85025 COMPLETE CBC W/AUTO DIFF WBC: CPT

## 2021-02-21 RX ORDER — LISINOPRIL 20 MG/1
40 TABLET ORAL
Status: COMPLETED | OUTPATIENT
Start: 2021-02-21 | End: 2021-02-21

## 2021-02-21 RX ORDER — NAPROXEN 500 MG/1
500 TABLET ORAL 2 TIMES DAILY WITH MEALS
Qty: 30 TABLET | Refills: 0 | Status: SHIPPED | OUTPATIENT
Start: 2021-02-21

## 2021-02-21 RX ORDER — NAPROXEN 500 MG/1
500 TABLET ORAL
Status: COMPLETED | OUTPATIENT
Start: 2021-02-21 | End: 2021-02-21

## 2021-02-21 RX ORDER — LISINOPRIL 40 MG/1
40 TABLET ORAL DAILY
Qty: 30 TABLET | Refills: 0 | Status: SHIPPED | OUTPATIENT
Start: 2021-02-21 | End: 2021-03-23

## 2021-02-21 RX ADMIN — SODIUM CHLORIDE 1000 ML: 0.9 INJECTION, SOLUTION INTRAVENOUS at 10:02

## 2021-02-21 RX ADMIN — NAPROXEN 500 MG: 500 TABLET ORAL at 10:02

## 2021-02-21 RX ADMIN — LISINOPRIL 40 MG: 20 TABLET ORAL at 10:02

## 2021-02-22 LAB
HCV AB SERPL QL IA: NEGATIVE
HIV 1+2 AB+HIV1 P24 AG SERPL QL IA: NEGATIVE

## 2021-04-16 ENCOUNTER — PATIENT MESSAGE (OUTPATIENT)
Dept: RESEARCH | Facility: HOSPITAL | Age: 61
End: 2021-04-16